# Patient Record
Sex: MALE | Race: WHITE
[De-identification: names, ages, dates, MRNs, and addresses within clinical notes are randomized per-mention and may not be internally consistent; named-entity substitution may affect disease eponyms.]

---

## 2018-01-10 ENCOUNTER — HOSPITAL ENCOUNTER (OUTPATIENT)
Dept: HOSPITAL 7 - FB.SDS | Age: 73
Discharge: HOME | End: 2018-01-10
Attending: SURGERY
Payer: MEDICARE

## 2018-01-10 VITALS — DIASTOLIC BLOOD PRESSURE: 74 MMHG | SYSTOLIC BLOOD PRESSURE: 138 MMHG

## 2018-01-10 DIAGNOSIS — E11.649: ICD-10-CM

## 2018-01-10 DIAGNOSIS — I10: ICD-10-CM

## 2018-01-10 DIAGNOSIS — K31.89: ICD-10-CM

## 2018-01-10 DIAGNOSIS — Z79.899: ICD-10-CM

## 2018-01-10 DIAGNOSIS — Z87.891: ICD-10-CM

## 2018-01-10 DIAGNOSIS — K21.0: ICD-10-CM

## 2018-01-10 DIAGNOSIS — Z88.0: ICD-10-CM

## 2018-01-10 DIAGNOSIS — Z88.8: ICD-10-CM

## 2018-01-10 DIAGNOSIS — I25.2: ICD-10-CM

## 2018-01-10 DIAGNOSIS — I25.10: ICD-10-CM

## 2018-01-10 DIAGNOSIS — E78.5: ICD-10-CM

## 2018-01-10 DIAGNOSIS — K29.70: Primary | ICD-10-CM

## 2018-01-10 DIAGNOSIS — Z95.5: ICD-10-CM

## 2018-01-10 PROCEDURE — 82962 GLUCOSE BLOOD TEST: CPT

## 2018-01-10 PROCEDURE — 43235 EGD DIAGNOSTIC BRUSH WASH: CPT

## 2018-01-10 NOTE — OR
DATE OF OPERATION:  01/10/2018

 

SURGEON:  Kartik Anders MD

 

PROCEDURE PERFORMED:  Upper endoscopy.

 

PREOPERATIVE DIAGNOSIS:  History of melena.

 

POSTOPERATIVE DIAGNOSIS:  Fundic gland hyperplasia and gastritis as well as some

esophagitis.

 

INDICATIONS FOR PROCEDURE:  This is a 72-year-old white male who was initially

referred to me with a history of a positive FIT exam, who was also relating some

GI issues or what appeared to be upper gastrointestinal issues.  He does have a

history of heart disease and was having some angina, and as a result while it

appeared he needed both an upper and lower endoscopy, there was no urgency

involved.  However, he presented approximately a week later, noting black stools

as well as some epigastric abdominal discomfort and we felt that an upper

endoscopy was indicated to evaluate for possible peptic ulcer disease.  He

presents today for his upper endoscopy.  Unfortunately, he was told to stop his

Plavix 3 days before his procedure, which he failed to do.  We did elect to

proceed with just a simple endoscopy to look for ulcer disease.

 

DESCRIPTION OF OPERATION:  After an excellent IV sedation was administered, the

bite block was inserted.  Topical anesthetic gargle was also used.  The flexible

endoscope was passed without difficulty down the patient's esophagus into the

stomach.  The stomach was insufflated.  The scope was passed through the pylorus

to the second portion of the duodenum and slowly withdrawn.  Following findings

are noted.  The duodenum was essentially unremarkable.  Stomach, fundic gland

hyperplasia noted as well as some linear gastritis.  Photos were taken.

Esophagus, some evidence of esophagitis, but no marked ulcerations or worrisome

findings.  Remainder of the esophageal exam was unremarkable.  We are going to

be putting him on some Carafate to take in addition to his Prilosec twice a day.

I will have him follow up after his cardiac evaluation to proceed with his

colonoscopy, and we will probably do an upper endoscopy at that point when we

can do some biopsies.

 

Job#: [id number]

DD: 01/10/2018 0937

DT: 01/10/2018 1051 AS/MODL

## 2018-01-10 NOTE — PCM.OPNOTE
- General Post-Op/Procedure Note


Date of Surgery/Procedure: 01/10/18


Operative Procedure(s): egd


Findings: 





gastritis


fundic gland polyposis


esophagitis





Pre Op Diagnosis: melena


Post-Op Diagnosis: gastritis.  fundic gland polyposis.  esophagitis


Anesthesia Technique: MAC


Primary Surgeon: Kartik Anders


Anesthesia Provider: Harry Dubon


Pathology: 





none





Complications: None


Condition: Good


Free Text/Narrative:: 





see dictation

## 2018-04-05 ENCOUNTER — HOSPITAL ENCOUNTER (OUTPATIENT)
Dept: HOSPITAL 7 - FB.SDS | Age: 73
Discharge: HOME | End: 2018-04-05
Attending: SURGERY
Payer: MEDICARE

## 2018-04-05 VITALS — DIASTOLIC BLOOD PRESSURE: 63 MMHG | SYSTOLIC BLOOD PRESSURE: 154 MMHG

## 2018-04-05 DIAGNOSIS — Z88.6: ICD-10-CM

## 2018-04-05 DIAGNOSIS — D12.4: Primary | ICD-10-CM

## 2018-04-05 DIAGNOSIS — K63.5: ICD-10-CM

## 2018-04-05 DIAGNOSIS — I25.10: ICD-10-CM

## 2018-04-05 DIAGNOSIS — K57.30: ICD-10-CM

## 2018-04-05 DIAGNOSIS — E11.649: ICD-10-CM

## 2018-04-05 DIAGNOSIS — Z88.0: ICD-10-CM

## 2018-04-05 DIAGNOSIS — D12.5: ICD-10-CM

## 2018-04-05 DIAGNOSIS — Z79.4: ICD-10-CM

## 2018-04-05 DIAGNOSIS — I10: ICD-10-CM

## 2018-04-05 DIAGNOSIS — K62.1: ICD-10-CM

## 2018-04-05 DIAGNOSIS — K21.9: ICD-10-CM

## 2018-04-05 DIAGNOSIS — E78.5: ICD-10-CM

## 2018-04-05 PROCEDURE — 45380 COLONOSCOPY AND BIOPSY: CPT

## 2018-04-05 PROCEDURE — 88305 TISSUE EXAM BY PATHOLOGIST: CPT

## 2018-04-05 PROCEDURE — 82962 GLUCOSE BLOOD TEST: CPT

## 2018-04-05 PROCEDURE — 00812 ANES LWR INTST SCR COLSC: CPT

## 2018-04-05 NOTE — PCM.OPNOTE
- General Post-Op/Procedure Note


Date of Surgery/Procedure: 04/05/18


Operative Procedure(s): c scope with bx


Findings: 





descending colon polyp 


sigmoid diverticulosis


rectal mucosal hyperplasia 


sigmoid polyp 


Pre Op Diagnosis: + FIT


Post-Op Diagnosis: descending colon polyp.  sigmoid diverticulosis.  rectal 

mucosal hyperplasia.  sigmoid polyp


Anesthesia Technique: MAC


Primary Surgeon: Kartik Anders


Anesthesia Provider: Coco Oreilly


Pathology: 








descending colon polyp 





rectal mucosal hyperplasia 


sigmoid polyp 


Complications: None


Condition: Good


Free Text/Narrative:: 





see dictation

## 2018-04-05 NOTE — OR
DATE OF OPERATION:  04/05/2018

 

SURGEON:  Kartik Anders MD

 

PROCEDURE PERFORMED:  Colonoscopy with cold forceps biopsy.

 

PREOPERATIVE DIAGNOSIS:  Positive FIT.

 

POSTOPERATIVE DIAGNOSIS:  Descending colon polyp, sigmoid colon polyp x2,

sigmoid diverticulosis and hyperplastic polyps of the rectum.

 

INDICATIONS FOR PROCEDURE:  This is a 72-year-old white male who is referred

with a positive FIT.  He was offered and accepted colonoscopy.

 

DESCRIPTION OF OPERATION:  After an excellent IV sedation was administered,

digital rectal exam was performed.  No marked abnormality was noted.  The

flexible colonoscope was inserted and advanced to the cecum without difficulty.

The prep was good.  We had to irrigate some areas with good view of the mucosa.

The following findings were noted.  Ascending colon, unremarkable.  Transverse

colon, unremarkable.  Descending colon, small polypoid lesion.  Biopsied and

sent for permanent.  Sigmoid; in the proximal and distal sigmoid, two small

polypoid lesions biopsied with cold biopsy forceps and sent for permanent.  Some

moderate sigmoid diverticulosis was also noted.  Rectum, what appeared to be

multiple hyperplastic polyps were noted and representative biopsies were taken

of this area as well.  The colon was deflated as the scope was removed.  The

patient tolerated the procedure well.  Results by letter.

 

Job#: [id number]

DD: 04/05/2018 1054

DT: 04/05/2018 1423 AS/MODL

## 2019-02-10 ENCOUNTER — HOSPITAL ENCOUNTER (EMERGENCY)
Dept: HOSPITAL 7 - FB.ED | Age: 74
Discharge: HOME | End: 2019-02-10
Payer: MEDICARE

## 2019-02-10 VITALS — SYSTOLIC BLOOD PRESSURE: 149 MMHG | DIASTOLIC BLOOD PRESSURE: 72 MMHG

## 2019-02-10 DIAGNOSIS — I25.2: ICD-10-CM

## 2019-02-10 DIAGNOSIS — E11.9: ICD-10-CM
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
## 2019-02-10 DIAGNOSIS — Z88.0: ICD-10-CM

## 2019-02-10 DIAGNOSIS — S82.832A: Primary | ICD-10-CM

## 2019-02-10 DIAGNOSIS — I10: ICD-10-CM

## 2019-02-10 DIAGNOSIS — Z88.1: ICD-10-CM

## 2019-02-10 DIAGNOSIS — Z79.899: ICD-10-CM

## 2019-02-10 NOTE — EDM.PDOC
ED HPI GENERAL MEDICAL PROBLEM





- General


Stated Complaint: FALL, HURT LEFT ANKLE


Time Seen by Provider: 02/10/19 09:42


Source of Information: Reports: Patient, Family


History Limitations: Reports: No Limitations





- History of Present Illness


INITIAL COMMENTS - FREE TEXT/NARRATIVE: 





73 y.o.w m with a h/o IDDM came with his wife to the ed afgter he fell under a 

truck with his left leg. Pt is not able to walk since. he came with is wife on 

the wheel chair to the ed. No N/V/D or dizziness or any other acute medical 

issues. /72 RR 17 Pulse ox 100% on RA Temp 97.5 Pulse 61


Onset Date: 02/10/19


Onset Time: 08:00


Duration: Hour(s):


Location: Reports: Lower Extremity, Left


Quality: Reports: Dull, Throbbing


Severity: Moderate


Improves with: Reports: Rest


Worsens with: Reports: Movement


Context: Reports: Trauma





- Related Data


 Allergies











Allergy/AdvReac Type Severity Reaction Status Date / Time


 


Penicillins Allergy Intermediate Hives Verified 18 11:25


 


meloxicam AdvReac Intermediate CHEST PAIN Verified 18 11:25





   WITH  





   WEAKNESS  











Home Meds: 


 Home Meds





Clopidogrel [Plavix] 75 mg PO DAILY 18 [History]


Insulin Glarg,Human.Rec.Analog [Lantus Solostar] 20 unit SUBCUT DAILY 18 [

History]


Lisinopril [Prinivil] 40 mg PO DAILY 18 [History]


Metoprolol Succinate [Toprol Xl] 25 mg PO DAILY 18 [History]


Nitroglycerin [Nitrostat] 0.4 mg SL ASDIRECTED PRN 18 [History]


Omeprazole Magnesium [Prilosec Otc] 20 mg PO BEDTIME 18 [History]


atorvaSTATin [Lipitor] 40 mg PO BEDTIME 18 [History]


glipiZIDE [Glipizide Xl] 10 mg PO BID 18 [History]


Sucralfate [Carafate] 1 gm PO ACBED #120 tab 01/10/18 [Rx]


Bismuth Subsalicylate [Pepto-Bismol] 30 ml PO ASDIRECTED 18 [History]


Empagliflozin [Jardiance] 10 mg PO DAILY 18 [History]


Fluticasone Propionate [Flonase] 1 spray NS BID 18 [History]


Isosorbide Mononitrate [Imdur] 30 mg PO DAILY 18 [History]


SitaGLIPtin [Januvia] 100 mg PO DAILY 18 [History]











Past Medical History


HEENT History: Reports: Cataract


Cardiovascular History: Reports: Angina, High Cholesterol, Hypertension, MI, 

Stents


Respiratory History: Reports: SOB


Gastrointestinal History: Reports: Diverticulosis, GERD


Genitourinary History: Reports: UTI, Recurrent, Other (See Below)


Other Genitourinary History: POST-TRAUMATIC BULBOUS URETHRAL STRICTURE


Musculoskeletal History: Reports: None


Neurological History: Reports: None


Psychiatric History: Reports: None


Endocrine/Metabolic History: Reports: Diabetes, Type II


Hematologic History: Reports: None


Immunologic History: Reports: None


Oncologic (Cancer) History: Reports: None


Dermatologic History: Reports: Cellulitis


Other Dermatologic History: CELLULITIS OF RIGHT LEG POST TRAUMA.





- Infectious Disease History


Infectious Disease History: Reports: Chicken Pox, Measles, Mumps





- Past Surgical History


Male  Surgical History: Reports: Vasectomy





Social & Family History





- Family History


GI: Reports: Colon Polyps


Other GI Family History: DAD  OF COLON CA





- Caffeine Use


Caffeine Use: Reports: Coffee, Soda





Review of Systems





- Review of Systems


Review Of Systems: See Below


Constitutional: Reports: No Symptoms


Eyes: Reports: No Symptoms


Ears: Reports: No Symptoms


Nose: Reports: No Symptoms


Mouth/Throat: Reports: No Symptoms


Respiratory: Reports: No Symptoms


Cardiovascular: Reports: No Symptoms


GI/Abdominal: Reports: No Symptoms


Genitourinary: Reports: No Symptoms


Musculoskeletal: Reports: Other (ankle swelling)


Skin: Reports: No Symptoms


Neurological: Reports: No Symptoms


Psychiatric: Reports: No Symptoms





ED EXAM, GENERAL





- Physical Exam


Exam: See Below


Exam Limited By: No Limitations


General Appearance: Alert, WD/WN, Moderate Distress


Eye Exam: Bilateral Eye: Normal Inspection


Ears: Normal External Exam


Ear Exam: Bilateral Ear: Auricle Normal


Nose: Normal Inspection, Normal Mucosa


Throat/Mouth: Normal Lips, Normal Voice, No Airway Compromise


Head: Atraumatic, Normocephalic


Neck: Normal Inspection, Supple, Non-Tender, Full Range of Motion


Respiratory/Chest: No Respiratory Distress, Lungs Clear, Normal Breath Sounds


Cardiovascular: Normal Peripheral Pulses, Regular Rate, Rhythm, No Edema, No 

Gallop


Peripheral Pulses: 2+: Carotid (L)


GI/Abdominal: Normal Bowel Sounds, Soft, Non-Tender, No Organomegaly, No 

Abnormal Bruit, No Mass, Pelvis Stable


 (Male) Exam: Deferred


Rectal (Males) Exam: Deferred


Back Exam: Normal Inspection


Extremities: Joint Swelling (left ankle)


Neurological: Alert, Oriented, CN II-XII Intact, Normal Cognition, Abnormal 

Gait (left ankle pain and swelling)


Psychiatric: Normal Affect, Normal Mood


Skin Exam: Warm, Dry, Intact, Normal Color


Lymphatic: No Adenopathy





ED TRAUMA EXTREMITY PROCEDURES





- Splinting


  ** Left Lower Extremity


Splint Site: short , posterior splint left lower extremity


Pre-Procedure NV Status: Normal


Post-Procedure NV Status: Normal


Splint Material: Fiberglass


Splint Design: Posterior


Applied & Form Fitted By: Provider


Provider Post-Splint Application NV Check: NV Status Normal, Good Position


Complications: No





Course





- Vital Signs


Text/Narrative:: 





73 y.o.w m with a h/o IDDM came with his wife to the ed afgter he fell under a 

truck with his left leg. Pt is not able to walk since. he came with is wife on 

the wheel chair to the ed. No N/V/D or dizziness or any other acute medical 

issues. /72 RR 17 Pulse ox 100% on RA Temp 97.5 Pulse 61


PE: WNWD W M with left ankle pain/swelling


Imaging: Fx'd mils displace left distal fibula


Labs: Not indicated


Impression: Left distal fibula fx with displacement


Tx: Motrin, Post short splint placement


Reexam: Improved


Plan: D/C with instructions


Last Recorded V/S: 


 Last Vital Signs











Temp      


 


Pulse      


 


Resp  17   02/10/19 12:05


 


BP  149/72 H  02/10/19 12:05


 


Pulse Ox  100   02/10/19 12:05














- Orders/Labs/Meds


Orders: 


 Active Orders 24 hr











 Category Date Time Status


 


 Accu Check [Blood Glucose Check, Bedside] [RC] ONETIME Care  02/10/19 09:56 

Active


 


 Cooling Warming Measures [RC] ASDIRECTED Care  02/10/19 09:54 Active


 


 Ankle Min 3V Lt [CR] Stat Exams  02/10/19 09:54 Taken


 


 Ice Bag [Ice Therapy] [OM.PC] Routine Oth  02/10/19 09:54 Ordered











Meds: 


Medications














Discontinued Medications














Generic Name Dose Route Start Last Admin





  Trade Name Destiny  PRN Reason Stop Dose Admin


 


Ibuprofen  600 mg  02/10/19 09:54  02/10/19 10:07





  Motrin  PO  02/10/19 09:55  600 mg





  ONETIME ONE   Administration





     





     





     





     














Departure





- Departure


Time of Disposition: 11:53


Disposition: Home, Self-Care 01


Condition: Good


Clinical Impression: 


Fibula fracture


Qualifiers:


 Encounter type: initial encounter Fracture type: closed Laterality: left 








- Discharge Information


Referrals: 


Maksim Love MD [Primary Care Provider] - 


Librado Campos DO [Physician] - 


Forms:  ED Department Discharge


Additional Instructions: 


Rest, Ice elevation, please use crutches, no weight bearing. Please f/u with 

Dr. Campos this Monday. Motrin for pain. Please come back if your symptoms get 

worse acutely





- My Orders


Last 24 Hours: 


My Active Orders





02/10/19 09:54


Cooling Warming Measures [RC] ASDIRECTED 


Ankle Min 3V Lt [CR] Stat 


Ice Bag [Ice Therapy] [OM.PC] Routine 





02/10/19 09:56


Accu Check [Blood Glucose Check, Bedside] [RC] ONETIME 














- Assessment/Plan


Last 24 Hours: 


My Active Orders





02/10/19 09:54


Cooling Warming Measures [RC] ASDIRECTED 


Ankle Min 3V Lt [CR] Stat 


Ice Bag [Ice Therapy] [OM.PC] Routine 





02/10/19 09:56


Accu Check [Blood Glucose Check, Bedside] [RC] ONETIME

## 2019-02-11 NOTE — CR
INDICATION:  Slipped and rolled ankle.



LEFT ANKLE:  Three views of the left ankle were obtained, 02/10/19 - no 
comparisons.  



Soft tissue swelling is noted about the ankle, most prominent medially.  



The ankle mortise appeared to be slightly askew with widening at the medial 
ankle mortise and very minimal inversion of the talus with respect to the 
tibia.  



An oblique fracture through the distal shaft and metaphysis of the fibula is 
noted with mild deformity - posterior and lateral offset of the distal fracture 
fragment of approximately 3-4 mm.  



Hypertrophic degenerative changes are also noted, compatible with posttraumatic 
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



Plantar and posterior calcaneal spurs of small size are noted.



IMPRESSION:  Lateral malleolar fracture with subluxation at the ankle mortise - 
lateral subluxation minimally of the talus with slight inversion of the talus.  
Ligamental injury medially is suspected.  The ankle mortise may be unstable - 
followup studies such as MRI may be helpful for further evaluation, depending 
upon clinical correlation.

LAKSHMI

## 2020-11-10 NOTE — OR
DATE OF OPERATION:  11/10/2020

 

SURGEON:  Zunilda Bazzi MD

 

PREOPERATIVE DIAGNOSIS:  Visually significant cataract, right eye.

 

POSTOPERATIVE DIAGNOSIS:  Visually significant cataract, right eye.

 

PROCEDURES PERFORMED:  Phacoemulsification with intraocular lens placement,

right eye.

 

ASSISTANTS:  None.

 

ANESTHESIA:  Local with sedation.

 

COMPLICATIONS:  None.

 

BLOOD LOSS:  None.

 

IMPLANTS:  An Franc ACU0T0, 22.0 diopter lens, serial number [id number]

implanted.

 

CDE:  6.52.

 

DESCRIPTION OF PROCEDURE:  After risks and benefits were reviewed with the

patient, consent was obtained in the preoperative area, and the operative eye

was marked with a surgical pen.  In the preoperative area, a pledget was used to

dilate the pupil consisting of a mixture of phenylephrine 10%, cyclopentolate

2%, moxifloxacin 0.5%, and bupivacaine 0.75%.  The patient was taken to the

operating room, where a time-out was performed, and the patient was placed under

monitored anesthesia care.  Topical tetracaine was used for anesthesia.

 

The operative eye was prepped and draped for ophthalmic surgery, and the

microscope was brought into position and focused.  A paracentesis incision was

made, followed by injection of preservative-free 1% lidocaine into the anterior

chamber, followed by injection of Viscoat into the anterior chamber.  A

microkeratome blade was used to make a corneal limbal incision temporally.  A

cystotome was used to make the beginning of the capsulorrhexis, which was

carried around 360 degrees in a curvilinear fashion using Utrata forceps.  A

Kwong cannula with BSS was used to hydrodissect and hydrodelineate the nucleus.

The nucleus was removed in a divide and conquer manner using

phacoemulsification.  Irrigation and aspiration were used to remove the

remaining cortical material.  Provisc was used to inflate the capsular bag, and

a pre-loaded Franc ACU0T0, 22.0 diopter lens, serial number [id number] was

injected into the capsular bag.  A Sinskey hook was used to position and center

the lens.

 

Next, irrigation and aspiration was used to remove any remaining viscoelastic

and cortical material from the anterior chamber.  BSS on a cannula was used to

inflate the anterior chamber and hydrate the wound.  The wound was checked and

found to be watertight.  1 mg of Moxifloxacin was injected into the anterior

chamber.  Drapes were removed and the eye was cleaned.  A drop of brimonidine

0.15% and a drop of TobraDex was placed.  The eye was shielded, and the patient

was taken to the recovery room in stable condition.

 

Job#: [id number]

DD: 11/10/2020 0822

DT: 11/10/2020 1029 AK/KRISTY

## 2020-11-25 NOTE — OR
DATE OF OPERATION:  11/24/2020

 

SURGEON:  Zunilda Bazzi MD

 

PREOPERATIVE DIAGNOSIS:  Visually significant cataract, left eye.

 

POSTOPERATIVE DIAGNOSIS:  Visually significant cataract, left eye.

 

PROCEDURES PERFORMED:  Phacoemulsification with intraocular lens placement, left

eye.

 

ASSISTANTS:  None.

 

ANESTHESIA:  Local with sedation.

 

COMPLICATIONS:  None.

 

BLOOD LOSS:  None.

 

IMPLANTS:  An Franc ACU0T0, 22.0 diopter lens implanted.

 

CDE:  4.22.

 

DESCRIPTION OF PROCEDURE:  After risks and benefits were reviewed with the

patient, consent was obtained in the preoperative area, and the operative eye

was marked with a surgical pen.  In the preoperative area, a pledget was used to

dilate the pupil consisting of a mixture of phenylephrine 10%, cyclopentolate

2%, moxifloxacin 0.5%, and bupivacaine 0.75%.  The patient was taken to the

operating room, where a time-out was performed, and the patient was placed under

monitored anesthesia care.  Topical tetracaine was used for anesthesia.

 

The operative eye was prepped and draped for ophthalmic surgery, and the

microscope was brought into position and focused.  A paracentesis incision was

made, followed by injection of preservative-free 1% lidocaine into the anterior

chamber, followed by injection of Viscoat into the anterior chamber.  A

microkeratome blade was used to make a corneal limbal incision temporally.  A

cystotome was used to make the beginning of the capsulorrhexis, which was

carried around 360 degrees in a curvilinear fashion using Utrata forceps.  A

Kwong cannula with BSS was used to hydrodissect and hydrodelineate the nucleus.

The nucleus was removed in a divide and conquer manner using

phacoemulsification.  Irrigation and aspiration were used to remove the

remaining cortical material.  Provisc was used to inflate the capsular bag, and

a pre-loaded Franc ACU0T0, 22.0 diopter lens, serial number [id number] was

injected into the capsular bag.  A Sinskey hook was used to position and center

the lens.

 

Next, irrigation and aspiration was used to remove any remaining viscoelastic

and cortical material from the anterior chamber.  BSS on a cannula was used to

inflate the anterior chamber and hydrate the wound.  The wound was checked and

found to be watertight.  1 mg of Moxifloxacin was injected into the anterior

chamber.  Drapes were removed and the eye was cleaned.  A drop of brimonidine

0.15% and a drop of TobraDex was placed.  The eye was shielded, and the patient

was taken to the recovery room in stable condition.

 

Job#: [id number]

DD: 11/24/2020 0821

DT: 11/24/2020 0939 AK/KRISTY

## 2021-01-13 NOTE — PCM.HP.2
H&P History of Present Illness





- General


Date of Service: 21


Admit Problem/Dx: 


                           Admission Diagnosis/Problem





Admission Diagnosis/Problem      Pneumonia








Source of Information: Patient, Provider


History Limitations: Reports: Physical Impairment (hard of hearing & some 

neurocognitive issues)





- History of Present Illness


Initial Comments - Free Text/Narative: 





Maksim was seen in [facility] today for 7 days of fever, fatigue, 

shortness of breath, cough, runny nose but no sore throat, chest pain, nausea, 

vomiting, diarrhea, constipation. Denies any dysuria, frequency, dark urine. 

States his last bowel movement was yesterday, yellow chunks but not liquid. Temp

in clinic today was 101F, Covid was positive and had bilateral infiltrates on 

chest x-ray, no other labs done in clinic. He is very hard of hearing and has 

some neurocognitive deficits, on Aricept. History of Diabetes on insulin, poorly

controlled, pacemaker, hypertension, cataract repair, colon polyps, GERD. 





- Related Data


Allergies/Adverse Reactions: 


                                    Allergies











Allergy/AdvReac Type Severity Reaction Status Date / Time


 


Penicillins Allergy Intermediate Hives Verified 20 07:24


 


iodine Allergy  Rash Verified 20 07:24


 


meloxicam AdvReac Intermediate Dizziness Verified 20 07:24











Home Medications: 


                                    Home Meds





Clopidogrel [Plavix] 75 mg PO DAILY 18 [History]


Metoprolol Succinate [Toprol Xl] 25 mg PO DAILY 18 [History]


Nitroglycerin [Nitrostat] 0.4 mg SL ASDIRECTED PRN 18 [History]


Omeprazole Magnesium [Prilosec Otc] 20 mg PO BEDTIME 18 [History]


atorvaSTATin [Lipitor] 40 mg PO BEDTIME 18 [History]


glipiZIDE [Glipizide Xl] 10 mg PO BID 18 [History]


Albuterol Sulfate [Albuterol Sulfate Hfa] 2 puff PO Q4HR PRN 20 [History]


Insulin Detemir [Levemir Flextouch] 20 unit SQ BID 20 [History]


SitaGLIPtin [Januvia] 100 mg PO DAILY 20 [History]


Triamcinolone Acetonide [Kenalog 0.1% Crm] 1 applic TOP BID 20 [History]


Acetaminophen 650 mg PO Q6H PRN 21 [History]


Donepezil [Aricept] 5 mg PO BEDTIME 21 [History]











Past Medical History


HEENT History: Reports: Cataract, Impaired Vision


Cardiovascular History: Reports: Angina, High Cholesterol, Hypertension, MI, 

Pacemaker, Stents


Respiratory History: Reports: SOB


Gastrointestinal History: Reports: Diverticulosis, GERD


Genitourinary History: Reports: UTI, Recurrent, Other (See Below)


Other Genitourinary History: POST-TRAUMATIC BULBOUS URETHRAL STRICTURE


Musculoskeletal History: Reports: None


Neurological History: Reports: None


Psychiatric History: Reports: None


Endocrine/Metabolic History: Reports: Diabetes, Type II


Hematologic History: Reports: None


Immunologic History: Reports: None


Oncologic (Cancer) History: Reports: None


Dermatologic History: Reports: Cellulitis


Other Dermatologic History: CELLULITIS OF RIGHT LEG POST TRAUMA.





- Infectious Disease History


Infectious Disease History: Reports: Chicken Pox, Measles, Mumps





- Past Surgical History


Head Surgeries/Procedures: Reports: None


HEENT Surgical History: Reports: Cataract Surgery


Cardiovascular Surgical History: Reports: Coronary Artery Stent


GI Surgical History: Reports: Colonoscopy, EGD


Male  Surgical History: Reports: Vasectomy


Other Male  Surgeries/Procedures: CYSTOSCOPY, STENT PLACEMENT TO KIDNEY





Social & Family History





- Family History


Family Medical History: No Pertinent Family History


GI: Reports: Colon Polyps


Other GI Family History: DAD  OF COLON CA





- Tobacco Use


Tobacco Use Status *Q: Former Tobacco User


Used Tobacco, but Quit: Yes


Month/Year Tobacco Last Used: 20 years ago





- Caffeine Use


Caffeine Use: Reports: Soda





- Recreational Drug Use


Recreational Drug Use: No





H&P Review of Systems





- Review of Systems:


Review Of Systems: Comprehensive ROS is negative, except as noted in HPI.





Exam





- Exam


Exam: See Below





- Vital Signs


Vital Signs: 


                                Last Vital Signs











Temp  98.8 F   21 16:30


 


Pulse  76   21 16:30


 


Resp  20   21 16:30


 


BP  139/68   21 16:30


 


Pulse Ox  91 L  21 16:30











Weight: 241 lb 14.4 oz





- Exam


Quality Assessment: Supplemental Oxygen


General: Alert, Oriented (person, place), Cooperative, Mild Distress


HEENT: PERRLA, Conjunctiva Clear, EACs Clear, EOMI, Mucosa Moist & Pink.  No: 

Hearing Intact


Neck: Supple, Trachea Midline.  No: Lymphadenopathy


Lungs: Clear to Auscultation (RML, BUL), Normal Respiratory Effort, Decreased 

Breath Sounds (bibasilar), Crackles (bibasilar)


Cardiovascular: Regular Rate, Regular Rhythm.  No: Systolic Murmur


GI/Abdominal Exam: Normal Bowel Sounds, Soft, Non-Tender, No Distention


 (Male) Exam: Deferred


Rectal (Males) Exam: Deferred


Extremities: Normal Inspection, No Pedal Edema, Normal Capillary Refill


Peripheral Pulses: 2+: Radial (L), Radial (R)


Skin: Warm, Dry, Intact





- Patient Data


Lab Results Last 24 hrs: 


CBC, CMP, PTT, CRP, ABG pending


Imaging Impressions Last 24 hrs: 


Bilateral patchy infiltrates on [facility] Chest x-ray





Sepsis Event Note





- Focused Exam


Vital Signs: 


                                   Vital Signs











  Temp Pulse Resp BP Pulse Ox


 


 21 16:30  98.8 F  76  20  139/68  91 L














*Q Meaningful Use (ADM)





- VTE Risk Assess *Q


Each Risk Factor Represents 1 Point: Obesity ( BMI > 25 kg/m2), Serious lung 

disease including pneumonia


Total Score 1 Point Risk Factors: 2


Each Risk Factor Represents 2 Points: None


Total Score 2 Point Risk Factors: 0


Each Risk Factor Represents 3 Points: Age 75 Years or Greater


Total Score 3 Point Risk Factors: 3


Each Risk Factor Represents 5 Points: None


Total Score 5 Point Risk Factors: 0


Venous Thromboembolism Risk Factor Score *Q: 5





- Problem List


(1) COVID-19


SNOMED Code(s): 379652785


   ICD Code: U07.1 - COVID-19   Status: Acute   Current Visit: Yes   





(2) Viral pneumonia


SNOMED Code(s): 73500897


   ICD Code: J12.9 - VIRAL PNEUMONIA, UNSPECIFIED   Status: Acute   Current 

Visit: Yes   





(3) Hypoxic


SNOMED Code(s): 861152343


   ICD Code: R09.02 - HYPOXEMIA   Status: Acute   Current Visit: Yes   





(4) Diabetes


SNOMED Code(s): 95697760


   ICD Code: E11.9 - TYPE 2 DIABETES MELLITUS WITHOUT COMPLICATIONS   Status: [facility]   Current Visit: Yes   


Qualifiers: 


   Diabetes mellitus long term insulin use: with long term use 





(5) GERD (gastroesophageal reflux disease)


SNOMED Code(s): 533701426


   ICD Code: K21.9 - GASTRO-ESOPHAGEAL REFLUX DISEASE WITHOUT ESOPHAGITIS   

Status: Chronic   Priority: Medium   Current Visit: No   


Qualifiers: 


   Esophagitis presence: with esophagitis 





(6) Neurocognitive deficits


SNOMED Code(s): 285181985


   ICD Code: R29.818 - OTHER SYMPTOMS AND SIGNS INVOLVING THE NERVOUS SYSTEM; 

R41.89 - OTH SYMPTOMS AND SIGNS W COGNITIVE FUNCTIONS AND AWARENESS   Status: 

Chronic   Current Visit: Yes   





(7) Palliative care patient


SNOMED Code(s): 101420861, 569753831


   ICD Code: Z51.5 - ENCOUNTER FOR PALLIATIVE CARE   Status: Chronic   Current 

Visit: Yes   





(8) CHF (congestive heart failure)


SNOMED Code(s): 63629840


   ICD Code: I50.9 - HEART FAILURE, UNSPECIFIED   Status: Chronic   Current 

Visit: Yes   Problem Details: Echo in 2018, EF of 65%   


Qualifiers: 


   Heart failure type: unspecified 


Problem List Initiated/Reviewed/Updated: Yes


Orders Last 24hrs: 


                               Active Orders 24 hr











 Category Date Time Status


 


 Patient Status [ADT] Routine ADT  21 16:55 Active


 


 Blood Glucose Check, Bedside [RC] QIDACANDBED Care  21 16:55 Active


 


 Incentive Spirometry [RT Incentive Spirometry] [RC] Care  21 17:08 Active





 Q1HWA   


 


 Nurse Communication: Isolation [RC] ASDIRECTED Care  21 17:02 Active


 


 Oxygen Therapy [RC] PRN Care  21 16:55 Active


 


 RT Post Treatment Assessment [RC] Click to Edit Care  21 17:04 Active


 


 Up With Assistance [RC] ASDIRECTED Care  21 16:55 Active


 


 Up to Chair [RC] ASDIRECTED Care  21 16:55 Active


 


 VTE/DVT Education [RC] Per Unit Routine Care  21 16:55 Active


 


 Vital Signs [RC] Q4H Care  21 16:55 Active


 


 Consistent Carbohydrate Diet [DIET] Diet  21 Dinner Active


 


 BLOOD GAS ARTERIAL [BG] Stat Lab  21 16:55 Ordered


 


 C-REACTIVE PROTEIN [CHEM] Stat Lab  21 16:55 Ordered


 


 CBC WITH AUTO DIFF [HEME] Routine Lab  21 06:00 Ordered


 


 CBC WITH AUTO DIFF [HEME] Stat Lab  21 16:55 Ordered


 


 COMPREHENSIVE METABOLIC PN,CMP [CHEM] Routine Lab  21 06:00 Ordered


 


 COMPREHENSIVE METABOLIC PN,CMP [CHEM] Stat Lab  21 16:55 Ordered


 


 PTT,PARTIAL THROMBOPLSTIN TIME [COAG] Stat Lab  21 16:55 Ordered


 


 Acetaminophen [TylenoL] Med  21 16:55 Active





 650 mg PO Q4H PRN   


 


 Albuterol [Ventolin HFA] Med  21 17:03 Active





 0 gm INH Q4H PRN   


 


 Clopidogrel [Plavix] Med  21 09:00 Active





 75 mg PO DAILY   


 


 Dextrose 50% in Water Med  21 17:02 Active





 50 ml IVPUSH ASDIRECTED PRN   


 


 Donepezil [Aricept] Med  21 21:00 Active





 5 mg PO BEDTIME   


 


 Glucagon,Human Recombinant [GlucaGen] Med  21 17:02 Active





 1 mg IM ASDIRECTED PRN   


 


 Insulin Glarg,Human.Rec.Analog [LantUS Solostar] Med  21 21:00 Active





 20 units SUBCUT BID   


 


 Insulin Lispro [HumaLOG] Med  21 18:00 Active





 See Protocol  SUBCUT TIDMEALS   


 


 Metoprolol Succinate [Toprol XL] Med  21 09:00 Active





 25 mg PO DAILY   


 


 Nitroglycerin [Nitrostat] Med  21 17:03 Active





 0.4 mg SL ASDIRECTED PRN   


 


 Ondansetron [Zofran ODT] Med  21 16:55 Active





 4 mg PO Q6H PRN   


 


 Pantoprazole [ProTONIX***] Med  21 21:00 Active





 40 mg PO BEDTIME   


 


 SitaGLIPtin [Januvia] Med  21 09:00 Active





 100 mg PO DAILY   


 


 Sodium Chloride 0.9% [Normal Saline] 1,000 ml Med  21 17:00 Active





 IV ASDIRECTED   


 


 Sodium Chloride 0.9% [Saline Flush] Med  21 16:55 Active





 10 ml FLUSH ASDIRECTED PRN   


 


 atorvaSTATin [Lipitor] Med  21 21:00 Active





 40 mg PO BEDTIME   


 


 dexAMETHasone Med  21 17:15 Active





 6 mg IVPUSH DAILY   


 


 glipiZIDE [Glucotrol XL] Med  21 21:00 Active





 10 mg PO BID   


 


 Antiembolic Hose [OM.PC] Per Unit Routine Oth  21 16:56 Ordered


 


 Isolation [COMM] Stat Oth  21 17:02 Ordered


 


 Saline Lock Insert [OM.PC] Routine Oth  21 16:55 Ordered


 


 Resuscitation Status Routine Resus Stat  21 16:55 Ordered








                                Medication Orders





Acetaminophen (Tylenol)  650 mg PO Q4H PRN


   PRN Reason: Pain (Mild 1-3)/fever


Albuterol (Ventolin Hfa)  0 gm INH Q4H PRN


   PRN Reason: Wheezing


Atorvastatin Calcium (Lipitor)  40 mg PO BEDTIME MIKAEL


Clopidogrel Bisulfate (Plavix)  75 mg PO DAILY MIKAEL


Dexamethasone (Dexamethasone)  6 mg IVPUSH DAILY MIKAEL


   Stop: 21 17:16


Dextrose/Water (Dextrose 50% In Water)  50 ml IVPUSH ASDIRECTED PRN


   PRN Reason: Hypoglycemia


Donepezil HCl (Aricept)  5 mg PO BEDTIME MIKAEL


Glipizide (Glucotrol Xl)  10 mg PO BID MIKAEL


Glucagon (Glucagen)  1 mg IM ASDIRECTED PRN


   PRN Reason: Hypoglycemia


Sodium Chloride (Normal Saline)  1,000 mls @ 100 mls/hr IV ASDIRECTED [facility]


Insulin Glargine (Lantus Solostar)  20 units SUBCUT BID [facility]


Insulin Human Lispro (Humalog)  0 unit SUBCUT TIDMEALS MIKAEL; Protocol


Metoprolol Succinate (Toprol Xl)  25 mg PO DAILY MIKAEL


Nitroglycerin (Nitrostat)  0.4 mg SL ASDIRECTED PRN


   PRN Reason: Chest Pain


Ondansetron HCl (Zofran Odt)  4 mg PO Q6H PRN


   PRN Reason: nausea, able to take PO


Pantoprazole Sodium (Protonix***)  40 mg PO BEDTIME MIKAEL


Sitagliptin Phosphate (Januvia)  100 mg PO DAILY [facility]


Sodium Chloride (Saline Flush)  10 ml FLUSH ASDIRECTED PRN


   PRN Reason: Keep Vein Open








Assessment/Plan Comment:: 





1. Admit to inpatient status for viral pneumonia, COVID positive, Diabetic.


2. CBC, CMP, PTT, ABG, CRP, procalcitonin pending. Dexamethasone 6 mg IV daily, 

will hold off on Remdesivir until chemistry is back. Oxygen to keep sats >94% by

 nasal cannula. Repeat labs tomorrow. NS at 100 ml/hr.


3. DM: Accuchecks qidac&hs, Consistent carb diet, Lantus substitute for Levemir 

20 units bid, Humalog sliding scale. Continue home oral medications. 


4. DVT: Hasmukh BLE, Plavix 75 mg daily, hold off on Lovenox until we get his 

chemistry.


5. CODE STATUS: FULL. 





- Mortality Measure


Prognosis:: Poor

## 2021-01-14 NOTE — PCM.PN
- General Info


Date of Service: 01/14/21


Subjective Update: 


Ramone is very hard of hearing but states he feels same, short of breath, sugars 

were low last night, held Glipizide, up to 311 this morning after dexamethasone 

yesterday. He is stable on 5L, has been more emotional since admission. 

Spiritual services will see. Spoke with his wife on phone, she will bring in 

 cord so she can talk with him on his cell phone. No nausea, vomiting, 

chest pain or diarrhea. 





- Patient Data


Vitals - Most Recent: 


                                Last Vital Signs











Temp  97.3 F   01/14/21 08:00


 


Pulse  93   01/14/21 09:03


 


Resp  20   01/14/21 08:00


 


BP  131/74   01/14/21 09:03


 


Pulse Ox  93 L  01/14/21 08:00











Weight - Most Recent: 241 lb 14.4 oz


I&O - Last 24 Hours: 


                                 Intake & Output











 01/13/21 01/14/21 01/14/21





 22:59 06:59 14:59


 


Intake Total   1446


 


Balance   1446











Lab Results Last 24 Hours: 


                         Laboratory Results - last 24 hr











  01/13/21 01/13/21 01/13/21 Range/Units





  17:37 18:15 18:20 


 


WBC    4.3  (3.2-10.1)  x10-3/uL


 


RBC    4.43  (3.90-5.90)  x10(6)uL


 


Hgb    12.6 L  (12.9-17.7)  g/dL


 


Hct    37.9 L  (38.3-50.1)  %


 


MCV    85.7  (80.8-98.7)  fL


 


MCH    28.5  (27.0-33.3)  pg


 


MCHC    33.3  (28.7-35.3)  g/dL


 


RDW    15.0  (12.4-15.0)  %


 


Plt Count    94 L  (117-477)  x10(3)uL


 


MPV    9.5  (6.7-11.0)  fL


 


Neut % (Auto)    77.5 H  (40.3-71.8)  %


 


Lymph % (Auto)    12.8 L  (15.8-45.3)  %


 


Mono % (Auto)    9.3  (5.5-15.2)  %


 


Eos % (Auto)    0.2  (0.1-6.8)  %


 


Baso % (Auto)    0.2 L  (0.3-3.8)  %


 


Neut # (Auto)    3.3  (1.7-6.9)  x10-3/uL


 


Lymph # (Auto)    0.6  (0.5-4.5)  x10-3/uL


 


Mono # (Auto)    0.4  (0.0-1.2)  x10-3/uL


 


Eos # (Auto)    0.0  (0.0-0.6)  x10-3/uL


 


Baso # (Auto)    0.0  (0.0-0.3)  x10-3/uL


 


Add Manual Diff     


 


Neutrophils % (Manual)     (46-82)  %


 


Band Neutrophils %     (0-6)  %


 


Lymphocytes % (Manual)     (13-37)  %


 


Monocytes % (Manual)     (4-12)  %


 


Giant Platelets     


 


Anisocytosis     


 


APTT     (24.4-33.2)  SECONDS


 


POC ABG pH   7.5 H   (7.35-7.45)  pH


 


POC ABG pCO2   30 L   (35-48)  mmHg


 


POC ABG pO2   56 L   ()  mmHg


 


POC ABG HCO3   22   (21-28)  mmol/L


 


ABG O2 Sat (Calculated)   91.2 L   (94-98)  %


 


POC ABG Base Excess   -2   (-2-3)  mmol/L


 


Terence Test   Pass   (PASS)  


 


O2 Delivery Device   Nasal cannula   


 


Sodium     (135-145)  mmol/L


 


Potassium     (3.5-5.3)  mmol/L


 


Chloride     (100-110)  mmol/L


 


Carbon Dioxide     (21-32)  mmol/L


 


BUN     (7-18)  mg/dL


 


Creatinine     (0.70-1.30)  mg/dL


 


Est Cr Clr Drug Dosing     mL/min


 


Estimated GFR (MDRD)     (>60)  


 


BUN/Creatinine Ratio     (9-20)  


 


Glucose     ()  mg/dL


 


POC Glucose  70 L    ()  mg/dL


 


Calcium     (8.6-10.2)  mg/dL


 


Total Bilirubin     (0.1-1.3)  mg/dL


 


AST     (5-25)  IU/L


 


ALT     (12-36)  U/L


 


Alkaline Phosphatase     ()  IU/L


 


C-Reactive Protein     (0.5-0.9)  mg/dL


 


Total Protein     (6.0-8.0)  g/dL


 


Albumin     (3.2-4.6)  g/dL


 


Globulin     g/dL


 


Albumin/Globulin Ratio     














  01/13/21 01/13/21 01/13/21 Range/Units





  18:20 18:20 18:20 


 


WBC     (3.2-10.1)  x10-3/uL


 


RBC     (3.90-5.90)  x10(6)uL


 


Hgb     (12.9-17.7)  g/dL


 


Hct     (38.3-50.1)  %


 


MCV     (80.8-98.7)  fL


 


MCH     (27.0-33.3)  pg


 


MCHC     (28.7-35.3)  g/dL


 


RDW     (12.4-15.0)  %


 


Plt Count     (117-477)  x10(3)uL


 


MPV     (6.7-11.0)  fL


 


Neut % (Auto)     (40.3-71.8)  %


 


Lymph % (Auto)     (15.8-45.3)  %


 


Mono % (Auto)     (5.5-15.2)  %


 


Eos % (Auto)     (0.1-6.8)  %


 


Baso % (Auto)     (0.3-3.8)  %


 


Neut # (Auto)     (1.7-6.9)  x10-3/uL


 


Lymph # (Auto)     (0.5-4.5)  x10-3/uL


 


Mono # (Auto)     (0.0-1.2)  x10-3/uL


 


Eos # (Auto)     (0.0-0.6)  x10-3/uL


 


Baso # (Auto)     (0.0-0.3)  x10-3/uL


 


Add Manual Diff     


 


Neutrophils % (Manual)     (46-82)  %


 


Band Neutrophils %     (0-6)  %


 


Lymphocytes % (Manual)     (13-37)  %


 


Monocytes % (Manual)     (4-12)  %


 


Giant Platelets     


 


Anisocytosis     


 


APTT  33.1    (24.4-33.2)  SECONDS


 


POC ABG pH     (7.35-7.45)  pH


 


POC ABG pCO2     (35-48)  mmHg


 


POC ABG pO2     ()  mmHg


 


POC ABG HCO3     (21-28)  mmol/L


 


ABG O2 Sat (Calculated)     (94-98)  %


 


POC ABG Base Excess     (-2-3)  mmol/L


 


Terence Test     (PASS)  


 


O2 Delivery Device     


 


Sodium   133 L   (135-145)  mmol/L


 


Potassium   3.0 L   (3.5-5.3)  mmol/L


 


Chloride   98 L   (100-110)  mmol/L


 


Carbon Dioxide   24   (21-32)  mmol/L


 


BUN   13   (7-18)  mg/dL


 


Creatinine   1.0   (0.70-1.30)  mg/dL


 


Est Cr Clr Drug Dosing   71.02   mL/min


 


Estimated GFR (MDRD)   > 60   (>60)  


 


BUN/Creatinine Ratio   13.0   (9-20)  


 


Glucose   72 L   ()  mg/dL


 


POC Glucose     ()  mg/dL


 


Calcium   8.1 L   (8.6-10.2)  mg/dL


 


Total Bilirubin   0.8   (0.1-1.3)  mg/dL


 


AST   51 H   (5-25)  IU/L


 


ALT   29   (12-36)  U/L


 


Alkaline Phosphatase   79   ()  IU/L


 


C-Reactive Protein    16.4 H*  (0.5-0.9)  mg/dL


 


Total Protein   6.6   (6.0-8.0)  g/dL


 


Albumin   2.8 L   (3.2-4.6)  g/dL


 


Globulin   3.8   g/dL


 


Albumin/Globulin Ratio   0.7   














  01/13/21 01/14/21 01/14/21 Range/Units





  20:20 06:01 07:19 


 


WBC    2.9 L  (3.2-10.1)  x10-3/uL


 


RBC    4.68  (3.90-5.90)  x10(6)uL


 


Hgb    13.4  (12.9-17.7)  g/dL


 


Hct    39.2  (38.3-50.1)  %


 


MCV    83.7  (80.8-98.7)  fL


 


MCH    28.6  (27.0-33.3)  pg


 


MCHC    34.1  (28.7-35.3)  g/dL


 


RDW    15.2 H  (12.4-15.0)  %


 


Plt Count    102 L  (117-477)  x10(3)uL


 


MPV    9.1  (6.7-11.0)  fL


 


Neut % (Auto)     (40.3-71.8)  %


 


Lymph % (Auto)     (15.8-45.3)  %


 


Mono % (Auto)     (5.5-15.2)  %


 


Eos % (Auto)     (0.1-6.8)  %


 


Baso % (Auto)     (0.3-3.8)  %


 


Neut # (Auto)     (1.7-6.9)  x10-3/uL


 


Lymph # (Auto)     (0.5-4.5)  x10-3/uL


 


Mono # (Auto)     (0.0-1.2)  x10-3/uL


 


Eos # (Auto)     (0.0-0.6)  x10-3/uL


 


Baso # (Auto)     (0.0-0.3)  x10-3/uL


 


Add Manual Diff    Yes  


 


Neutrophils % (Manual)    68  (46-82)  %


 


Band Neutrophils %    2  (0-6)  %


 


Lymphocytes % (Manual)    22  (13-37)  %


 


Monocytes % (Manual)    8  (4-12)  %


 


Giant Platelets    Few  


 


Anisocytosis    Few  


 


APTT     (24.4-33.2)  SECONDS


 


POC ABG pH     (7.35-7.45)  pH


 


POC ABG pCO2     (35-48)  mmHg


 


POC ABG pO2     ()  mmHg


 


POC ABG HCO3     (21-28)  mmol/L


 


ABG O2 Sat (Calculated)     (94-98)  %


 


POC ABG Base Excess     (-2-3)  mmol/L


 


Terence Test     (PASS)  


 


O2 Delivery Device     


 


Sodium     (135-145)  mmol/L


 


Potassium     (3.5-5.3)  mmol/L


 


Chloride     (100-110)  mmol/L


 


Carbon Dioxide     (21-32)  mmol/L


 


BUN     (7-18)  mg/dL


 


Creatinine     (0.70-1.30)  mg/dL


 


Est Cr Clr Drug Dosing     mL/min


 


Estimated GFR (MDRD)     (>60)  


 


BUN/Creatinine Ratio     (9-20)  


 


Glucose     ()  mg/dL


 


POC Glucose  195 H  277 H   ()  mg/dL


 


Calcium     (8.6-10.2)  mg/dL


 


Total Bilirubin     (0.1-1.3)  mg/dL


 


AST     (5-25)  IU/L


 


ALT     (12-36)  U/L


 


Alkaline Phosphatase     ()  IU/L


 


C-Reactive Protein     (0.5-0.9)  mg/dL


 


Total Protein     (6.0-8.0)  g/dL


 


Albumin     (3.2-4.6)  g/dL


 


Globulin     g/dL


 


Albumin/Globulin Ratio     














  01/14/21 01/14/21 Range/Units





  07:19 11:35 


 


WBC    (3.2-10.1)  x10-3/uL


 


RBC    (3.90-5.90)  x10(6)uL


 


Hgb    (12.9-17.7)  g/dL


 


Hct    (38.3-50.1)  %


 


MCV    (80.8-98.7)  fL


 


MCH    (27.0-33.3)  pg


 


MCHC    (28.7-35.3)  g/dL


 


RDW    (12.4-15.0)  %


 


Plt Count    (117-477)  x10(3)uL


 


MPV    (6.7-11.0)  fL


 


Neut % (Auto)    (40.3-71.8)  %


 


Lymph % (Auto)    (15.8-45.3)  %


 


Mono % (Auto)    (5.5-15.2)  %


 


Eos % (Auto)    (0.1-6.8)  %


 


Baso % (Auto)    (0.3-3.8)  %


 


Neut # (Auto)    (1.7-6.9)  x10-3/uL


 


Lymph # (Auto)    (0.5-4.5)  x10-3/uL


 


Mono # (Auto)    (0.0-1.2)  x10-3/uL


 


Eos # (Auto)    (0.0-0.6)  x10-3/uL


 


Baso # (Auto)    (0.0-0.3)  x10-3/uL


 


Add Manual Diff    


 


Neutrophils % (Manual)    (46-82)  %


 


Band Neutrophils %    (0-6)  %


 


Lymphocytes % (Manual)    (13-37)  %


 


Monocytes % (Manual)    (4-12)  %


 


Giant Platelets    


 


Anisocytosis    


 


APTT    (24.4-33.2)  SECONDS


 


POC ABG pH    (7.35-7.45)  pH


 


POC ABG pCO2    (35-48)  mmHg


 


POC ABG pO2    ()  mmHg


 


POC ABG HCO3    (21-28)  mmol/L


 


ABG O2 Sat (Calculated)    (94-98)  %


 


POC ABG Base Excess    (-2-3)  mmol/L


 


Terence Test    (PASS)  


 


O2 Delivery Device    


 


Sodium  136   (135-145)  mmol/L


 


Potassium  3.8   (3.5-5.3)  mmol/L


 


Chloride  103  D   (100-110)  mmol/L


 


Carbon Dioxide  24   (21-32)  mmol/L


 


BUN  17   (7-18)  mg/dL


 


Creatinine  1.0   (0.70-1.30)  mg/dL


 


Est Cr Clr Drug Dosing  71.02   mL/min


 


Estimated GFR (MDRD)  > 60   (>60)  


 


BUN/Creatinine Ratio  17.0   (9-20)  


 


Glucose  311 H D   ()  mg/dL


 


POC Glucose   269 H  ()  mg/dL


 


Calcium  7.9 L   (8.6-10.2)  mg/dL


 


Total Bilirubin  0.6   (0.1-1.3)  mg/dL


 


AST  51 H   (5-25)  IU/L


 


ALT  31   (12-36)  U/L


 


Alkaline Phosphatase  82   ()  IU/L


 


C-Reactive Protein    (0.5-0.9)  mg/dL


 


Total Protein  6.6   (6.0-8.0)  g/dL


 


Albumin  2.5 L   (3.2-4.6)  g/dL


 


Globulin  4.1   g/dL


 


Albumin/Globulin Ratio  0.6   











Med Orders - Current: 


                               Current Medications





Acetaminophen (Tylenol)  650 mg PO Q4H PRN


   PRN Reason: Pain (Mild 1-3)/fever


   Last Admin: 01/13/21 20:52 Dose:  650 mg


   Documented by: 


Albuterol (Ventolin Hfa)  0 gm INH Q4H PRN


   PRN Reason: Wheezing


Atorvastatin Calcium (Lipitor)  40 mg PO BEDTIME [facility]


   Last Admin: 01/13/21 20:23 Dose:  40 mg


   Documented by: 


Clopidogrel Bisulfate (Plavix)  75 mg PO DAILY [facility]


   Last Admin: 01/14/21 09:03 Dose:  75 mg


   Documented by: 


Dexamethasone (Dexamethasone)  6 mg IVPUSH DAILY [facility]


   Stop: 01/23/21 17:16


   Last Admin: 01/14/21 09:02 Dose:  6 mg


   Documented by: 


Dextrose/Water (Dextrose 50% In Water)  50 ml IVPUSH ASDIRECTED PRN


   PRN Reason: Hypoglycemia


Donepezil HCl (Aricept)  5 mg PO BEDTIME [facility]


   Last Admin: 01/13/21 20:22 Dose:  5 mg


   Documented by: 


Glucagon (Glucagen)  1 mg IM ASDIRECTED PRN


   PRN Reason: Hypoglycemia


Sodium Chloride (Normal Saline)  1,000 mls @ 100 mls/hr IV ASDIRECTED [facility]


   Last Admin: 01/14/21 04:10 Dose:  100 mls/hr


   Documented by: 


Remdesivir 100 mg/ Sodium (Chloride)  100 mls @ 100 mls/hr IV Q24H [facility]


   Stop: 01/17/21 18:59


Insulin Glargine (Lantus Solostar)  20 units SUBCUT BID [facility]


   Last Admin: 01/14/21 09:04 Dose:  20 units


   Documented by: 


Insulin Human Lispro (Humalog)  0 unit SUBCUT TIDMEALS [facility]; Protocol


   Last Admin: 01/14/21 12:12 Dose:  6 units


   Documented by: 


Metoprolol Succinate (Toprol Xl)  25 mg PO DAILY [facility]


   Last Admin: 01/14/21 09:03 Dose:  25 mg


   Documented by: 


Nitroglycerin (Nitrostat)  0.4 mg SL ASDIRECTED PRN


   PRN Reason: Chest Pain


Ondansetron HCl (Zofran Odt)  4 mg PO Q6H PRN


   PRN Reason: nausea, able to take PO


Pantoprazole Sodium (Protonix***)  40 mg PO BEDTIME [facility]


   Last Admin: 01/13/21 20:23 Dose:  40 mg


   Documented by: 


Potassium Chloride (Klor-Con M20)  20 meq PO BID [facility]


   Last Admin: 01/14/21 09:03 Dose:  20 meq


   Documented by: 





Discontinued Medications





Glipizide (Glucotrol Xl)  10 mg PO BID [facility]


   Last Admin: 01/13/21 20:15 Dose:  Not Given


   Documented by: 


Remdesivir 200 mg/ Sodium (Chloride)  250 mls @ 200 mls/hr IV ONETIME ONE


   Stop: 01/13/21 21:01


   Last Admin: 01/13/21 20:23 Dose:  200 mls/hr


   Documented by: 


Insulin Glargine (Lantus Solostar)  20 units SUBCUT BID [facility]


   Last Admin: 01/13/21 20:26 Dose:  20 unit


   Documented by: 


Sitagliptin Phosphate (Januvia)  100 mg PO DAILY [facility]


   Last Admin: 01/14/21 09:03 Dose:  100 mg


   Documented by: 


Sodium Chloride (Saline Flush)  10 ml FLUSH ASDIRECTED PRN


   PRN Reason: Keep Vein Open











- Exam


Quality Assessment: Supplemental Oxygen (5L)


General: Alert, Oriented (person, place), Cooperative, No Acute Distress


Lungs: Clear to Auscultation, Normal Respiratory Effort, Decreased Breath Sounds

(BLL), Crackles (fine crackles bibasilar).  No: Wheezing


Cardiovascular: Regular Rate, Regular Rhythm


GI/Abdominal Exam: Soft, Non-Tender, No Distention, Abnormal Bowel Sounds 

(hyperactive, belching)


Extremities: No Pedal Edema


Peripheral Pulses: 2+: Radial (L), Radial (R)


Skin: Warm, Dry, Intact





Sepsis Event Note





- Evaluation


Sepsis Screening Result: Sepsis Risk





- Focused Exam


Vital Signs: 


                                   Vital Signs











  Temp Temp Pulse Pulse Resp BP BP


 


 01/14/21 09:03    93    131/74 


 


 01/14/21 08:00  97.3 F    93  20   131/74


 


 01/14/21 04:50   98.1 F   65  20   152/82 H














  Pulse Ox


 


 01/14/21 09:03 


 


 01/14/21 08:00  93 L


 


 01/14/21 04:50  92 L














- Problem List & Annotations


(1) COVID-19


SNOMED Code(s): 558926582


   Code(s): U07.1 - COVID-19   Status: Acute   Current Visit: Yes   





(2) Viral pneumonia


SNOMED Code(s): 04941452


   Code(s): J12.9 - VIRAL PNEUMONIA, UNSPECIFIED   Status: Acute   Current 

Visit: Yes   





(3) Hypoxic


SNOMED Code(s): 182555360


   Code(s): R09.02 - HYPOXEMIA   Status: Acute   Current Visit: Yes   





(4) Diabetes


SNOMED Code(s): 26634082


   Code(s): E11.9 - TYPE 2 DIABETES MELLITUS WITHOUT COMPLICATIONS   Status: 

Chronic   Current Visit: Yes   


Qualifiers: 


   Diabetes mellitus long term insulin use: with long term use 





(5) GERD (gastroesophageal reflux disease)


SNOMED Code(s): 480205526


   Code(s): K21.9 - GASTRO-ESOPHAGEAL REFLUX DISEASE WITHOUT ESOPHAGITIS   

Status: Chronic   Priority: Medium   Current Visit: No   


Qualifiers: 


   Esophagitis presence: with esophagitis 





(6) Neurocognitive deficits


SNOMED Code(s): 151752417


   Code(s): R29.818 - OTHER SYMPTOMS AND SIGNS INVOLVING THE NERVOUS SYSTEM; 

R41.89 - OTH SYMPTOMS AND SIGNS W COGNITIVE FUNCTIONS AND AWARENESS   Status: 

Chronic   Current Visit: Yes   





(7) Palliative care patient


SNOMED Code(s): 095935235, 341015526


   Code(s): Z51.5 - ENCOUNTER FOR PALLIATIVE CARE   Status: Chronic   Current 

Visit: Yes   





(8) CHF (congestive heart failure)


SNOMED Code(s): 24935456


   Code(s): I50.9 - HEART FAILURE, UNSPECIFIED   Status: Chronic   Current 

Visit: Yes   


Qualifiers: 


   Heart failure type: unspecified 


Annotation/Comment:: Echo in 04/2018, EF of 65%   





- Problem List Review


Problem List Initiated/Reviewed/Updated: Yes





- My Orders


Last 24 Hours: 


My Active Orders





01/13/21 Dinner


Consistent Carbohydrate Diet [DIET] 





01/13/21 16:55


Patient Status [ADT] Routine 


Blood Glucose Check, Bedside [RC] QIDACANDBED 


Oxygen Therapy [RC] PRN 


Up With Assistance [RC] ASDIRECTED 


Up to Chair [RC] ASDIRECTED 


Vital Signs [RC] Q4H 


Acetaminophen [TylenoL]   650 mg PO Q4H PRN 


Ondansetron [Zofran ODT]   4 mg PO Q6H PRN 


Saline Lock Insert [OM.PC] Routine 


Resuscitation Status Routine 





01/13/21 16:56


Antiembolic Hose [OM.PC] Per Unit Routine 





01/13/21 17:00


Sodium Chloride 0.9% [Normal Saline] 1,000 ml IV ASDIRECTED 





01/13/21 17:02


Dextrose 50% in Water   50 ml IVPUSH ASDIRECTED PRN 


Glucagon,Human Recombinant [GlucaGen]   1 mg IM ASDIRECTED PRN 


Isolation [COMM] Stat 





01/13/21 17:03


Albuterol [Ventolin HFA]   0 gm INH Q4H PRN 


Nitroglycerin [Nitrostat]   0.4 mg SL ASDIRECTED PRN 





01/13/21 17:04


RT Post Treatment Assessment [RC] Click to Edit 





01/13/21 17:08


Incentive Spirometry [RT Incentive Spirometry] [RC] Q1HWA 





01/13/21 17:15


dexAMETHasone   6 mg IVPUSH DAILY 





01/13/21 18:00


Insulin Lispro [HumaLOG]   See Protocol  SUBCUT TIDMEALS 





01/13/21 18:20


PROCALCITONIN Routine 





01/13/21 21:00


Donepezil [Aricept]   5 mg PO BEDTIME 


Pantoprazole [ProTONIX***]   40 mg PO BEDTIME 


Potassium Chloride [Klor-Con M20]   20 meq PO BID 


atorvaSTATin [Lipitor]   40 mg PO BEDTIME 





01/14/21 07:58


Insulin Glarg,Human.Rec.Analog [LantUS Solostar]   20 units SUBCUT BID 





01/14/21 09:00


Clopidogrel [Plavix]   75 mg PO DAILY 


Metoprolol Succinate [Toprol XL]   25 mg PO DAILY 





01/14/21 18:00


Remdesivir 100 mg   Sodium Chloride 0.9% [Normal Saline] 100 ml IV Q24H 





01/15/21 06:00


CBC WITH AUTO DIFF [HEME] DAILY 


HEPATIC FUNCTION PANEL,HFP [CHEM] DAILY 





01/16/21 06:00


CBC WITH AUTO DIFF [HEME] DAILY 


HEPATIC FUNCTION PANEL,HFP [CHEM] DAILY 





01/17/21 06:00


CBC WITH AUTO DIFF [HEME] DAILY 


HEPATIC FUNCTION PANEL,HFP [CHEM] DAILY 





01/18/21 06:00


CBC WITH AUTO DIFF [HEME] DAILY 


HEPATIC FUNCTION PANEL,HFP [CHEM] DAILY 





01/19/21 06:00


CBC WITH AUTO DIFF [HEME] DAILY 


HEPATIC FUNCTION PANEL,HFP [CHEM] DAILY 














- Plan


Plan:: 





1. COVID viral pneumonia: CBC 4.3 last night, 2.9 today. Cr 1.0, AST at 51, will

 continue to monitor, procalcitonin pending. Dexamethasone 6 mg IV daily, 

Remdesivir 200 mg given last night, will get 100 mg daily, 5 day course. Oxygen 

to keep sats >94% by nasal cannula. Repeat labs tomorrow. NS at 100 ml/hr, if 

having good oral intake will decrease or saline lock. 


2. Recurrent UTIs: noted by nursing that his urine is malodorous, UA ordered, 

will treat if needed. 


3. DM: Accuchecks qidac&hs, Consistent carb diet, Lantus substitute for Levemir 

20 units bid, Humalog sliding scale. Hold oral diabetic meds at this time.  


4. DVT: TEDs BLE, Plavix 75 mg daily, platelets are low, will continue to 

monitor, no Lovenox at this time.

## 2021-01-15 NOTE — PCM.PN
- General Info


Date of Service: 01/15/21


Subjective Update: 





Ramone is up in the chair and feeling better. Had high blood sugar last night but 

has trended down to 227 this morning. Drank a liter of water overnight, nurse 

just refilled his container. No nausea or vomiting. Breathing feels it is 

better. Does miss his wife. 





- Patient Data


Vitals - Most Recent: 


                                Last Vital Signs











Temp  97.6 F   01/15/21 09:00


 


Pulse  70   01/15/21 09:36


 


Resp  16   01/15/21 09:00


 


BP  115/74   01/15/21 09:36


 


Pulse Ox  96   01/15/21 09:00











Weight - Most Recent: 241 lb 14.4 oz


I&O - Last 24 Hours: 


                                 Intake & Output











 01/14/21 01/15/21 01/15/21





 22:59 06:59 14:59


 


Intake Total 1783 847 


 


Output Total 500 500 


 


Balance 1283 347 











Lab Results Last 24 Hours: 


                         Laboratory Results - last 24 hr











  01/14/21 01/14/21 01/14/21 Range/Units





  11:35 17:27 20:33 


 


WBC     (3.2-10.1)  x10-3/uL


 


RBC     (3.90-5.90)  x10(6)uL


 


Hgb     (12.9-17.7)  g/dL


 


Hct     (38.3-50.1)  %


 


MCV     (80.8-98.7)  fL


 


MCH     (27.0-33.3)  pg


 


MCHC     (28.7-35.3)  g/dL


 


RDW     (12.4-15.0)  %


 


Plt Count     (117-477)  x10(3)uL


 


MPV     (6.7-11.0)  fL


 


Neut % (Auto)     (40.3-71.8)  %


 


Lymph % (Auto)     (15.8-45.3)  %


 


Mono % (Auto)     (5.5-15.2)  %


 


Eos % (Auto)     (0.1-6.8)  %


 


Baso % (Auto)     (0.3-3.8)  %


 


Neut # (Auto)     (1.7-6.9)  x10-3/uL


 


Lymph # (Auto)     (0.5-4.5)  x10-3/uL


 


Mono # (Auto)     (0.0-1.2)  x10-3/uL


 


Eos # (Auto)     (0.0-0.6)  x10-3/uL


 


Baso # (Auto)     (0.0-0.3)  x10-3/uL


 


POC Glucose  269 H  347 H  425 H*  ()  mg/dL


 


Total Bilirubin     (0.1-1.3)  mg/dL


 


Direct Bilirubin     (0.10-0.20)  mg/dL


 


AST     (5-25)  IU/L


 


ALT     (12-36)  U/L


 


Alkaline Phosphatase     ()  IU/L


 


Total Protein     (6.0-8.0)  g/dL


 


Albumin     (3.2-4.6)  g/dL


 


Urine Color     (YELLOW)  


 


Urine Appearance     (CLEAR)  


 


Urine pH     (5.0-6.5)  


 


Ur Specific Gravity     (1.010-1.025)  


 


Urine Protein     (NEGATIVE)  mg/dL


 


Urine Glucose (UA)     (NORMAL)  mg/dL


 


Urine Ketones     (NEGATIVE)  mg/dL


 


Urine Occult Blood     (NEGATIVE)  


 


Urine Nitrite     (NEGATIVE)  


 


Urine Bilirubin     (NEGATIVE)  


 


Urine Urobilinogen     (NEGATIVE)  mg/dL


 


Ur Leukocyte Esterase     (NEGATIVE)  


 


Urine RBC     (0-5)  


 


Urine WBC     (0-5)  


 


Ur Squamous Epith Cells     (NS,R,O)  


 


Amorphous Sediment     


 


Urine Bacteria     (NS)  














  01/14/21 01/14/21 01/15/21 Range/Units





  21:55 22:00 01:41 


 


WBC     (3.2-10.1)  x10-3/uL


 


RBC     (3.90-5.90)  x10(6)uL


 


Hgb     (12.9-17.7)  g/dL


 


Hct     (38.3-50.1)  %


 


MCV     (80.8-98.7)  fL


 


MCH     (27.0-33.3)  pg


 


MCHC     (28.7-35.3)  g/dL


 


RDW     (12.4-15.0)  %


 


Plt Count     (117-477)  x10(3)uL


 


MPV     (6.7-11.0)  fL


 


Neut % (Auto)     (40.3-71.8)  %


 


Lymph % (Auto)     (15.8-45.3)  %


 


Mono % (Auto)     (5.5-15.2)  %


 


Eos % (Auto)     (0.1-6.8)  %


 


Baso % (Auto)     (0.3-3.8)  %


 


Neut # (Auto)     (1.7-6.9)  x10-3/uL


 


Lymph # (Auto)     (0.5-4.5)  x10-3/uL


 


Mono # (Auto)     (0.0-1.2)  x10-3/uL


 


Eos # (Auto)     (0.0-0.6)  x10-3/uL


 


Baso # (Auto)     (0.0-0.3)  x10-3/uL


 


POC Glucose   299 H  227 H  ()  mg/dL


 


Total Bilirubin     (0.1-1.3)  mg/dL


 


Direct Bilirubin     (0.10-0.20)  mg/dL


 


AST     (5-25)  IU/L


 


ALT     (12-36)  U/L


 


Alkaline Phosphatase     ()  IU/L


 


Total Protein     (6.0-8.0)  g/dL


 


Albumin     (3.2-4.6)  g/dL


 


Urine Color  Yellow    (YELLOW)  


 


Urine Appearance  Cloudy    (CLEAR)  


 


Urine pH  5.0    (5.0-6.5)  


 


Ur Specific Gravity  1.015    (1.010-1.025)  


 


Urine Protein  Negative    (NEGATIVE)  mg/dL


 


Urine Glucose (UA)  >1000 H    (NORMAL)  mg/dL


 


Urine Ketones  15 H    (NEGATIVE)  mg/dL


 


Urine Occult Blood  Negative    (NEGATIVE)  


 


Urine Nitrite  Negative    (NEGATIVE)  


 


Urine Bilirubin  Negative    (NEGATIVE)  


 


Urine Urobilinogen  Normal    (NEGATIVE)  mg/dL


 


Ur Leukocyte Esterase  Negative    (NEGATIVE)  


 


Urine RBC  0-5    (0-5)  


 


Urine WBC  0-5    (0-5)  


 


Ur Squamous Epith Cells  Occasional    (NS,R,O)  


 


Amorphous Sediment  Moderate    


 


Urine Bacteria  Moderate H    (NS)  














  01/15/21 01/15/21 Range/Units





  06:30 06:30 


 


WBC   7.1  (3.2-10.1)  x10-3/uL


 


RBC   4.36  (3.90-5.90)  x10(6)uL


 


Hgb   12.5 L  (12.9-17.7)  g/dL


 


Hct   36.5 L  (38.3-50.1)  %


 


MCV   83.7  (80.8-98.7)  fL


 


MCH   28.6  (27.0-33.3)  pg


 


MCHC   34.2  (28.7-35.3)  g/dL


 


RDW   15.6 H  (12.4-15.0)  %


 


Plt Count   135  (117-477)  x10(3)uL


 


MPV   9.1  (6.7-11.0)  fL


 


Neut % (Auto)   85.5 H  (40.3-71.8)  %


 


Lymph % (Auto)   8.9 L  (15.8-45.3)  %


 


Mono % (Auto)   5.5  (5.5-15.2)  %


 


Eos % (Auto)   0.0 L  (0.1-6.8)  %


 


Baso % (Auto)   0.1 L  (0.3-3.8)  %


 


Neut # (Auto)   6.1  (1.7-6.9)  x10-3/uL


 


Lymph # (Auto)   0.6  (0.5-4.5)  x10-3/uL


 


Mono # (Auto)   0.4  (0.0-1.2)  x10-3/uL


 


Eos # (Auto)   0.0  (0.0-0.6)  x10-3/uL


 


Baso # (Auto)   0.0  (0.0-0.3)  x10-3/uL


 


POC Glucose    ()  mg/dL


 


Total Bilirubin  0.3   (0.1-1.3)  mg/dL


 


Direct Bilirubin  0.13   (0.10-0.20)  mg/dL


 


AST  41 H D   (5-25)  IU/L


 


ALT  30   (12-36)  U/L


 


Alkaline Phosphatase  67   ()  IU/L


 


Total Protein  5.8 L   (6.0-8.0)  g/dL


 


Albumin  2.3 L   (3.2-4.6)  g/dL


 


Urine Color    (YELLOW)  


 


Urine Appearance    (CLEAR)  


 


Urine pH    (5.0-6.5)  


 


Ur Specific Gravity    (1.010-1.025)  


 


Urine Protein    (NEGATIVE)  mg/dL


 


Urine Glucose (UA)    (NORMAL)  mg/dL


 


Urine Ketones    (NEGATIVE)  mg/dL


 


Urine Occult Blood    (NEGATIVE)  


 


Urine Nitrite    (NEGATIVE)  


 


Urine Bilirubin    (NEGATIVE)  


 


Urine Urobilinogen    (NEGATIVE)  mg/dL


 


Ur Leukocyte Esterase    (NEGATIVE)  


 


Urine RBC    (0-5)  


 


Urine WBC    (0-5)  


 


Ur Squamous Epith Cells    (NS,R,O)  


 


Amorphous Sediment    


 


Urine Bacteria    (NS)  











Med Orders - Current: 


                               Current Medications





Acetaminophen (Tylenol)  650 mg PO Q4H PRN


   PRN Reason: Pain (Mild 1-3)/fever


   Last Admin: 01/15/21 02:27 Dose:  650 mg


   Documented by: 


Albuterol (Ventolin Hfa)  0 gm INH Q4H PRN


   PRN Reason: Wheezing


Atorvastatin Calcium (Lipitor)  40 mg PO BEDTIME [facility]


   Last Admin: 01/14/21 20:36 Dose:  40 mg


   Documented by: 


Clopidogrel Bisulfate (Plavix)  75 mg PO DAILY [facility]


   Last Admin: 01/15/21 09:36 Dose:  75 mg


   Documented by: 


Dexamethasone (Dexamethasone)  6 mg IVPUSH DAILY [facility]


   Stop: 01/23/21 17:16


   Last Admin: 01/15/21 08:16 Dose:  6 mg


   Documented by: 


Dextrose/Water (Dextrose 50% In Water)  50 ml IVPUSH ASDIRECTED PRN


   PRN Reason: Hypoglycemia


Donepezil HCl (Aricept)  5 mg PO BEDTIME [facility]


   Last Admin: 01/14/21 20:37 Dose:  5 mg


   Documented by: 


Glucagon (Glucagen)  1 mg IM ASDIRECTED PRN


   PRN Reason: Hypoglycemia


Sodium Chloride (Normal Saline)  1,000 mls @ 100 mls/hr IV ASDIRECTED [facility]


   Last Admin: 01/15/21 01:18 Dose:  100 mls/hr


   Documented by: 


Remdesivir 100 mg/ Sodium (Chloride)  100 mls @ 100 mls/hr IV Q24H [facility]


   Stop: 01/17/21 18:59


   Last Admin: 01/14/21 18:18 Dose:  100 mls/hr


   Documented by: 


Insulin Glargine (Lantus Solostar)  20 units SUBCUT BID [facility]


   Last Admin: 01/15/21 08:17 Dose:  20 units


   Documented by: 


Insulin Human Lispro (Humalog)  0 unit SUBCUT TIDMEALS [facility]; Protocol


   Last Admin: 01/15/21 08:13 Dose:  3 units


   Documented by: 


Metoprolol Succinate (Toprol Xl)  25 mg PO DAILY [facility]


   Last Admin: 01/15/21 09:36 Dose:  25 mg


   Documented by: 


Nitroglycerin (Nitrostat)  0.4 mg SL ASDIRECTED PRN


   PRN Reason: Chest Pain


Ondansetron HCl (Zofran Odt)  4 mg PO Q6H PRN


   PRN Reason: nausea, able to take PO


Pantoprazole Sodium (Protonix***)  40 mg PO BEDTIME [facility]


   Last Admin: 01/14/21 20:37 Dose:  40 mg


   Documented by: 


Potassium Chloride (Klor-Con M20)  20 meq PO BID [facility]


   Last Admin: 01/15/21 09:36 Dose:  20 meq


   Documented by: 


Sodium Chloride (Saline Flush)  10 ml FLUSH ASDIRECTED PRN


   PRN Reason: Keep Vein Open


   Last Admin: 01/14/21 19:18 Dose:  10 ml


   Documented by: 





Discontinued Medications





Glipizide (Glucotrol Xl)  10 mg PO BID [facility]


   Last Admin: 01/13/21 20:15 Dose:  Not Given


   Documented by: 


Remdesivir 200 mg/ Sodium (Chloride)  250 mls @ 200 mls/hr IV ONETIME ONE


   Stop: 01/13/21 21:01


   Last Admin: 01/13/21 20:23 Dose:  200 mls/hr


   Documented by: 


Insulin Glargine (Lantus Solostar)  20 units SUBCUT BID [facility]


   Last Admin: 01/13/21 20:26 Dose:  20 unit


   Documented by: 


Sitagliptin Phosphate (Januvia)  100 mg PO DAILY [facility]


   Last Admin: 01/14/21 09:03 Dose:  100 mg


   Documented by: 


Sodium Chloride (Saline Flush)  10 ml FLUSH ASDIRECTED PRN


   PRN Reason: Keep Vein Open











- Exam


Quality Assessment: Supplemental Oxygen


General: Alert, Oriented (person, place), Cooperative, No Acute Distress


Lungs: Clear to Auscultation, Normal Respiratory Effort, Decreased Breath Sounds

(bibasilar), Crackles (bibasilar).  No: Wheezing


Cardiovascular: Regular Rate, Regular Rhythm


GI/Abdominal Exam: Soft, Non-Tender, No Distention, Abnormal Bowel Sounds 

(hyperactive x4)


 (Male) Exam: Deferred


Extremities: No Pedal Edema


Peripheral Pulses: 2+: Radial (L), Radial (R)





Sepsis Event Note





- Evaluation


Sepsis Screening Result: No Definite Risk





- Focused Exam


Vital Signs: 


                                   Vital Signs











  Temp Temp Pulse Pulse Resp BP BP


 


 01/15/21 09:36    70    115/74 


 


 01/15/21 09:00   97.6 F   70  16   115/74


 


 01/15/21 05:00  96.5 F L    68  22 H   114/55 L


 


 01/15/21 01:56       


 


 01/15/21 00:00  96.7 F L    63  22 H   142/78 H














  Pulse Ox


 


 01/15/21 09:36 


 


 01/15/21 09:00  96


 


 01/15/21 05:00  96


 


 01/15/21 01:56  97


 


 01/15/21 00:00  97














- Problem List & Annotations


(1) COVID-19


SNOMED Code(s): 847189987


   Code(s): U07.1 - COVID-19   Status: Acute   Current Visit: Yes   





(2) Viral pneumonia


SNOMED Code(s): 70524159


   Code(s): J12.9 - VIRAL PNEUMONIA, UNSPECIFIED   Status: Acute   Current 

Visit: Yes   





(3) Hypoxic


SNOMED Code(s): 513964811


   Code(s): R09.02 - HYPOXEMIA   Status: Acute   Current Visit: Yes   





(4) Diabetes


SNOMED Code(s): 87603705


   Code(s): E11.9 - TYPE 2 DIABETES MELLITUS WITHOUT COMPLICATIONS   Status: 

Chronic   Current Visit: Yes   


Qualifiers: 


   Diabetes mellitus long term insulin use: with long term use 





(5) GERD (gastroesophageal reflux disease)


SNOMED Code(s): 743864109


   Code(s): K21.9 - GASTRO-ESOPHAGEAL REFLUX DISEASE WITHOUT ESOPHAGITIS   

Status: Chronic   Priority: Medium   Current Visit: No   


Qualifiers: 


   Esophagitis presence: with esophagitis 





(6) Neurocognitive deficits


SNOMED Code(s): 206923732


   Code(s): R29.818 - OTHER SYMPTOMS AND SIGNS INVOLVING THE NERVOUS SYSTEM; 

R41.89 - OTH SYMPTOMS AND SIGNS W COGNITIVE FUNCTIONS AND AWARENESS   Status: 

Chronic   Current Visit: Yes   





(7) Palliative care patient


SNOMED Code(s): 286831223, 990086268


   Code(s): Z51.5 - ENCOUNTER FOR PALLIATIVE CARE   Status: Chronic   Current 

Visit: Yes   





(8) CHF (congestive heart failure)


SNOMED Code(s): 86307738


   Code(s): I50.9 - HEART FAILURE, UNSPECIFIED   Status: Chronic   Current 

Visit: Yes   


Qualifiers: 


   Heart failure type: unspecified 


Annotation/Comment:: Echo in 04/2018, EF of 65%   





- Problem List Review


Problem List Initiated/Reviewed/Updated: Yes





- My Orders


Last 24 Hours: 


My Active Orders





01/14/21 18:00


Remdesivir 100 mg   Sodium Chloride 0.9% [Normal Saline] 100 ml IV Q24H 





01/14/21 19:00


Sodium Chloride 0.9% [Saline Flush]   10 ml FLUSH ASDIRECTED PRN 





01/14/21 21:25


CULTURE URINE [RM] Routine 





01/16/21 06:00


CBC WITH AUTO DIFF [HEME] DAILY 


HEPATIC FUNCTION PANEL,HFP [CHEM] DAILY 





01/16/21 08:01


CREATININE W/GFR [CHEM] DAILY 





01/17/21 06:00


CBC WITH AUTO DIFF [HEME] DAILY 


HEPATIC FUNCTION PANEL,HFP [CHEM] DAILY 





01/17/21 08:01


CREATININE W/GFR [CHEM] DAILY 





01/18/21 06:00


CBC WITH AUTO DIFF [HEME] DAILY 


HEPATIC FUNCTION PANEL,HFP [CHEM] DAILY 





01/18/21 08:01


CREATININE W/GFR [CHEM] DAILY 





01/19/21 06:00


CBC WITH AUTO DIFF [HEME] DAILY 


HEPATIC FUNCTION PANEL,HFP [CHEM] DAILY 





01/19/21 08:01


CREATININE W/GFR [CHEM] DAILY 














- Plan


Plan:: 





1. COVID viral pneumonia: CBC 7.1. Cr 1.0, AST at 41, will continue to monitor, 

procalcitonin pending. Dexamethasone & Remdesivir day 3/5. Oxygen to keep sats 

>94% by nasal cannula. Repeat labs tomorrow. 


2. Good oral intake, saline lock. 


3. DM: Accuchecks qidac&hs, Consistent carb diet, Lantus substitute for Levemir 

20 units bid, Humalog high dose sliding scale. Hold oral diabetic meds at this 

time.  


4. DVT: TEDs BLE, Plavix 75 mg daily, platelets are low, will continue to 

monitor, no Lovenox at this time.

## 2021-01-16 NOTE — PCM.PN
- General Info


Date of Service: 01/16/21


Subjective Update: 





Ramone slept better last night but not drinking very much, had low blood pressure 

this morning, held his metoprolol. Blood sugar was 80s this morning, held his 

morning Lantus dose, didn't eat much this morning. Having a lot of belching, 

keep bed at 30 degrees. Short of breath a little worse but weaned down to 1L 

now. 





- Patient Data


Vitals - Most Recent: 


                                Last Vital Signs











Temp  97.7 F   01/16/21 08:25


 


Pulse  68   01/16/21 08:25


 


Resp  16   01/16/21 08:25


 


BP  111/63   01/16/21 08:25


 


Pulse Ox  97   01/16/21 08:25











Weight - Most Recent: 241 lb 14.4 oz


I&O - Last 24 Hours: 


                                 Intake & Output











 01/15/21 01/16/21 01/16/21





 22:59 06:59 14:59


 


Intake Total 100  


 


Balance 100  











Lab Results Last 24 Hours: 


                         Laboratory Results - last 24 hr











  01/15/21 01/15/21 01/15/21 Range/Units





  06:30 11:33 16:44 


 


WBC     (3.2-10.1)  x10-3/uL


 


RBC     (3.90-5.90)  x10(6)uL


 


Hgb     (12.9-17.7)  g/dL


 


Hct     (38.3-50.1)  %


 


MCV     (80.8-98.7)  fL


 


MCH     (27.0-33.3)  pg


 


MCHC     (28.7-35.3)  g/dL


 


RDW     (12.4-15.0)  %


 


Plt Count     (117-477)  x10(3)uL


 


MPV     (6.7-11.0)  fL


 


Add Manual Diff     


 


Neutrophils % (Manual)     (46-82)  %


 


Band Neutrophils %     (0-6)  %


 


Lymphocytes % (Manual)     (13-37)  %


 


Monocytes % (Manual)     (4-12)  %


 


Microcytosis     


 


Creatinine     (0.70-1.30)  mg/dL


 


Est Cr Clr Drug Dosing     mL/min


 


Estimated GFR (MDRD)     (>60)  


 


POC Glucose  198 H  276 H  311 H  ()  mg/dL


 


Total Bilirubin     (0.1-1.3)  mg/dL


 


Direct Bilirubin     (0.10-0.20)  mg/dL


 


AST     (5-25)  IU/L


 


ALT     (12-36)  U/L


 


Alkaline Phosphatase     ()  IU/L


 


Total Protein     (6.0-8.0)  g/dL


 


Albumin     (3.2-4.6)  g/dL














  01/15/21 01/16/21 01/16/21 Range/Units





  20:18 06:30 06:50 


 


WBC     (3.2-10.1)  x10-3/uL


 


RBC     (3.90-5.90)  x10(6)uL


 


Hgb     (12.9-17.7)  g/dL


 


Hct     (38.3-50.1)  %


 


MCV     (80.8-98.7)  fL


 


MCH     (27.0-33.3)  pg


 


MCHC     (28.7-35.3)  g/dL


 


RDW     (12.4-15.0)  %


 


Plt Count     (117-477)  x10(3)uL


 


MPV     (6.7-11.0)  fL


 


Add Manual Diff     


 


Neutrophils % (Manual)     (46-82)  %


 


Band Neutrophils %     (0-6)  %


 


Lymphocytes % (Manual)     (13-37)  %


 


Monocytes % (Manual)     (4-12)  %


 


Microcytosis     


 


Creatinine   0.9   (0.70-1.30)  mg/dL


 


Est Cr Clr Drug Dosing   78.91   mL/min


 


Estimated GFR (MDRD)   > 60   (>60)  


 


POC Glucose  360 H    ()  mg/dL


 


Total Bilirubin    0.5  (0.1-1.3)  mg/dL


 


Direct Bilirubin    0.16  (0.10-0.20)  mg/dL


 


AST    49 H D  (5-25)  IU/L


 


ALT    38 H D  (12-36)  U/L


 


Alkaline Phosphatase    78  ()  IU/L


 


Total Protein    6.4  (6.0-8.0)  g/dL


 


Albumin    2.6 L  (3.2-4.6)  g/dL














  01/16/21 Range/Units





  06:50 


 


WBC  8.6  (3.2-10.1)  x10-3/uL


 


RBC  4.53  (3.90-5.90)  x10(6)uL


 


Hgb  12.9  (12.9-17.7)  g/dL


 


Hct  37.8 L  (38.3-50.1)  %


 


MCV  83.5  (80.8-98.7)  fL


 


MCH  28.4  (27.0-33.3)  pg


 


MCHC  34.0  (28.7-35.3)  g/dL


 


RDW  15.3 H  (12.4-15.0)  %


 


Plt Count  137  (117-477)  x10(3)uL


 


MPV  8.6  (6.7-11.0)  fL


 


Add Manual Diff  Yes  


 


Neutrophils % (Manual)  78  (46-82)  %


 


Band Neutrophils %  3  (0-6)  %


 


Lymphocytes % (Manual)  13  (13-37)  %


 


Monocytes % (Manual)  6  (4-12)  %


 


Microcytosis  Moderate H  


 


Creatinine   (0.70-1.30)  mg/dL


 


Est Cr Clr Drug Dosing   mL/min


 


Estimated GFR (MDRD)   (>60)  


 


POC Glucose   ()  mg/dL


 


Total Bilirubin   (0.1-1.3)  mg/dL


 


Direct Bilirubin   (0.10-0.20)  mg/dL


 


AST   (5-25)  IU/L


 


ALT   (12-36)  U/L


 


Alkaline Phosphatase   ()  IU/L


 


Total Protein   (6.0-8.0)  g/dL


 


Albumin   (3.2-4.6)  g/dL











Curt Results Last 24 Hours: 


                                  Microbiology











 01/14/21 21:25 Urine Culture - Preliminary





 Urine, Voided    MIXED POSITIVE MODESTO DAY 1











Med Orders - Current: 


                               Current Medications





Acetaminophen (Tylenol)  650 mg PO Q4H PRN


   PRN Reason: Pain (Mild 1-3)/fever


   Last Admin: 01/15/21 02:27 Dose:  650 mg


   Documented by: 


Albuterol (Ventolin Hfa)  0 gm INH Q4H PRN


   PRN Reason: Wheezing


Atorvastatin Calcium (Lipitor)  40 mg PO BEDTIME [facility]


   Last Admin: 01/15/21 20:25 Dose:  40 mg


   Documented by: 


Calcium Carbonate/Glycine (Tums)  500 mg PO Q2H PRN


   PRN Reason: Indigestion


   Last Admin: 01/16/21 10:36 Dose:  500 mg


   Documented by: 


Clopidogrel Bisulfate (Plavix)  75 mg PO DAILY [facility]


   Last Admin: 01/16/21 08:02 Dose:  75 mg


   Documented by: 


Dexamethasone (Dexamethasone)  6 mg IVPUSH DAILY [facility]


   Stop: 01/23/21 17:16


   Last Admin: 01/16/21 08:00 Dose:  6 mg


   Documented by: 


Dextrose/Water (Dextrose 50% In Water)  50 ml IVPUSH ASDIRECTED PRN


   PRN Reason: Hypoglycemia


Donepezil HCl (Aricept)  5 mg PO BEDTIME [facility]


   Last Admin: 01/15/21 20:25 Dose:  5 mg


   Documented by: 


Glucagon (Glucagen)  1 mg IM ASDIRECTED PRN


   PRN Reason: Hypoglycemia


Hydroxyzine HCl (Atarax)  25 mg PO BEDTIME PRN


   PRN Reason: Insomnia


   Last Admin: 01/15/21 21:37 Dose:  25 mg


   Documented by: 


Sodium Chloride (Normal Saline)  1,000 mls @ 75 mls/hr IV ASDIRECTED [facility]


   Last Admin: 01/16/21 10:29 Dose:  100 mls/hr


   Documented by: 


Remdesivir 100 mg/ Sodium (Chloride)  100 mls @ 100 mls/hr IV Q24H [facility]


   Stop: 01/17/21 18:59


   Last Admin: 01/15/21 17:21 Dose:  100 mls/hr


   Documented by: 


Insulin Glargine (Lantus Solostar)  20 units SUBCUT BID [facility]


   Last Admin: 01/16/21 09:36 Dose:  Not Given


   Documented by: 


Insulin Human Lispro (Humalog)  0 unit SUBCUT TIDMEALS [facility]; Protocol


   Last Admin: 01/16/21 07:20 Dose:  Not Given


   Documented by: 


Metoprolol Succinate (Toprol Xl)  25 mg PO DAILY [facility]


   Last Admin: 01/16/21 09:36 Dose:  Not Given


   Documented by: 


Nitroglycerin (Nitrostat)  0.4 mg SL ASDIRECTED PRN


   PRN Reason: Chest Pain


Ondansetron HCl (Zofran Odt)  4 mg PO Q6H PRN


   PRN Reason: nausea, able to take PO


Pantoprazole Sodium (Protonix***)  40 mg PO BEDTIME [facility]


   Last Admin: 01/15/21 20:25 Dose:  40 mg


   Documented by: 


Potassium Chloride (Klor-Con M20)  20 meq PO BID [facility]


   Last Admin: 01/16/21 08:01 Dose:  20 meq


   Documented by: 


Sodium Chloride (Saline Flush)  10 ml FLUSH ASDIRECTED PRN


   PRN Reason: Keep Vein Open


   Last Admin: 01/16/21 10:32 Dose:  10 ml


   Documented by: 





Discontinued Medications





Glipizide (Glucotrol Xl)  10 mg PO BID [facility]


   Last Admin: 01/13/21 20:15 Dose:  Not Given


   Documented by: 


Remdesivir 200 mg/ Sodium (Chloride)  250 mls @ 200 mls/hr IV ONETIME ONE


   Stop: 01/13/21 21:01


   Last Admin: 01/13/21 20:23 Dose:  200 mls/hr


   Documented by: 


Insulin Glargine (Lantus Solostar)  20 units SUBCUT BID [facility]


   Last Admin: 01/13/21 20:26 Dose:  20 unit


   Documented by: 


Sitagliptin Phosphate (Januvia)  100 mg PO DAILY [facility]


   Last Admin: 01/14/21 09:03 Dose:  100 mg


   Documented by: 


Sodium Chloride (Saline Flush)  10 ml FLUSH ASDIRECTED PRN


   PRN Reason: Keep Vein Open











- Exam


Quality Assessment: Supplemental Oxygen


General: Alert, Oriented (person), Cooperative, No Acute Distress


Neck: Trachea Midline


Lungs: Clear to Auscultation (Right lobes), Normal Respiratory Effort, Decreased

Breath Sounds (bibasilar), Crackles (LLL to midlung).  No: Wheezing


Cardiovascular: Regular Rate, Regular Rhythm


GI/Abdominal Exam: Normal Bowel Sounds, Soft, Non-Tender, No Distention, Other 

(belching for a few minutes continuous during exam)


Extremities: No Pedal Edema


Peripheral Pulses: 2+: Radial (L), Radial (R)





Sepsis Event Note





- Evaluation


Sepsis Screening Result: No Definite Risk





- Focused Exam


Vital Signs: 


                                   Vital Signs











  Temp Pulse Resp BP Pulse Ox Pulse Ox


 


 01/16/21 08:25  97.7 F  68  16  111/63  97 


 


 01/16/21 07:35  97.6 F  64  16  88/42 L  97 


 


 01/16/21 05:00  96.9 F  64  18  112/61  96 


 


 01/16/21 01:30  96.4 F L  68  18  104/57 L  97 


 


 01/16/21 00:00       97














- Problem List & Annotations


(1) COVID-19


SNOMED Code(s): 468131532


   Code(s): U07.1 - COVID-19   Status: Acute   Current Visit: Yes   





(2) Viral pneumonia


SNOMED Code(s): 04654354


   Code(s): J12.9 - VIRAL PNEUMONIA, UNSPECIFIED   Status: Acute   Current 

Visit: Yes   





(3) Hypoxic


SNOMED Code(s): 991214942


   Code(s): R09.02 - HYPOXEMIA   Status: Acute   Current Visit: Yes   





(4) Diabetes


SNOMED Code(s): 76798547


   Code(s): E11.9 - TYPE 2 DIABETES MELLITUS WITHOUT COMPLICATIONS   Status: 

Chronic   Current Visit: Yes   


Qualifiers: 


   Diabetes mellitus long term insulin use: with long term use 





(5) GERD (gastroesophageal reflux disease)


SNOMED Code(s): 357365548


   Code(s): K21.9 - GASTRO-ESOPHAGEAL REFLUX DISEASE WITHOUT ESOPHAGITIS   

Status: Chronic   Priority: Medium   Current Visit: No   


Qualifiers: 


   Esophagitis presence: with esophagitis 





(6) Neurocognitive deficits


SNOMED Code(s): 340893505


   Code(s): R29.818 - OTHER SYMPTOMS AND SIGNS INVOLVING THE NERVOUS SYSTEM; 

R41.89 - OTH SYMPTOMS AND SIGNS W COGNITIVE FUNCTIONS AND AWARENESS   Status: 

Chronic   Current Visit: Yes   





(7) Palliative care patient


SNOMED Code(s): 805984192, 835817879


   Code(s): Z51.5 - ENCOUNTER FOR PALLIATIVE CARE   Status: Chronic   Current 

Visit: Yes   





(8) CHF (congestive heart failure)


SNOMED Code(s): 06409137


   Code(s): I50.9 - HEART FAILURE, UNSPECIFIED   Status: Chronic   Current 

Visit: Yes   


Qualifiers: 


   Heart failure type: unspecified 


Annotation/Comment:: Echo in 04/2018, EF of 65%   





- Problem List Review


Problem List Initiated/Reviewed/Updated: Yes





- My Orders


Last 24 Hours: 


My Active Orders





01/15/21 11:26


Convert IV to Peripheral Lock [Convert IV to Saline Lock] [OM.PC] Routine 





01/15/21 21:17


hydrOXYzine HCL [Atarax]   25 mg PO BEDTIME PRN 





01/16/21 10:24


CXR [Chest 1V Frontal] [CR] Routine 


Calcium Carbonate [Tums]   500 mg PO Q2H PRN 





01/17/21 06:00


CBC WITH AUTO DIFF [HEME] DAILY 





01/17/21 10:35


COMPREHENSIVE METABOLIC PN,CMP [CHEM] DAILY 





01/18/21 06:00


CBC WITH AUTO DIFF [HEME] DAILY 





01/18/21 10:35


COMPREHENSIVE METABOLIC PN,CMP [CHEM] DAILY 





01/19/21 06:00


CBC WITH AUTO DIFF [HEME] DAILY 





01/19/21 10:35


COMPREHENSIVE METABOLIC PN,CMP [CHEM] DAILY 














- Plan


Plan:: 





1. COVID viral pneumonia: CBC 8.6. Cr 0.9, AST at 49, will continue to monitor, 

procalcitonin pending. Dexamethasone & Remdesivir day 4/5. Oxygen to keep sats 

>94% by nasal cannula. Repeat labs tomorrow. 


2. Decreased oral intake, NS at 75ml/hr. 


3. DM: Accuchecks qidac&hs, Consistent carb diet, Lantus substitute for Levemir 

20 units bid, held morning dose due to decreased appetite, Humalog decrease to 

medium dose sliding scale. Hold oral diabetic meds at this time.  


4. DVT: TEDs BLE, Plavix 75 mg daily, platelets are low, will continue to 

monitor, no Lovenox at this time. 


5. Dyspepsia: Tums 500 mg q2h as needed, keep head of bed at 30 degrees.

## 2021-01-17 NOTE — PCM.PN
- General Info


Date of Service: 01/17/21


Subjective Update: 





Had a restless night, more delirious. Weaned down to 1L. Not eating or drinking 

very well. Discontinue Hydroxyzine since he slept so much during the day 

yesterday. 





- Patient Data


Vitals - Most Recent: 


                                Last Vital Signs











Temp  98.7 F   01/17/21 05:30


 


Pulse  81   01/17/21 07:00


 


Resp  18   01/17/21 07:00


 


BP  100/45 L  01/17/21 05:30


 


Pulse Ox  96   01/17/21 07:00











Weight - Most Recent: 241 lb 14.4 oz


I&O - Last 24 Hours: 


                                 Intake & Output











 01/16/21 01/17/21 01/17/21





 22:59 06:59 14:59


 


Intake Total 926 616 


 


Balance 926 616 











Lab Results Last 24 Hours: 


                         Laboratory Results - last 24 hr











  01/13/21 01/16/21 01/16/21 Range/Units





  18:20 06:45 12:10 


 


WBC     (3.2-10.1)  x10-3/uL


 


RBC     (3.90-5.90)  x10(6)uL


 


Hgb     (12.9-17.7)  g/dL


 


Hct     (38.3-50.1)  %


 


MCV     (80.8-98.7)  fL


 


MCH     (27.0-33.3)  pg


 


MCHC     (28.7-35.3)  g/dL


 


RDW     (12.4-15.0)  %


 


Plt Count     (117-477)  x10(3)uL


 


MPV     (6.7-11.0)  fL


 


Neut % (Auto)     (40.3-71.8)  %


 


Lymph % (Auto)     (15.8-45.3)  %


 


Mono % (Auto)     (5.5-15.2)  %


 


Eos % (Auto)     (0.1-6.8)  %


 


Baso % (Auto)     (0.3-3.8)  %


 


Neut # (Auto)     (1.7-6.9)  x10-3/uL


 


Lymph # (Auto)     (0.5-4.5)  x10-3/uL


 


Mono # (Auto)     (0.0-1.2)  x10-3/uL


 


Eos # (Auto)     (0.0-0.6)  x10-3/uL


 


Baso # (Auto)     (0.0-0.3)  x10-3/uL


 


Sodium     (135-145)  mmol/L


 


Potassium     (3.5-5.3)  mmol/L


 


Chloride     (100-110)  mmol/L


 


Carbon Dioxide     (21-32)  mmol/L


 


BUN     (7-18)  mg/dL


 


Creatinine     (0.70-1.30)  mg/dL


 


Est Cr Clr Drug Dosing     mL/min


 


Estimated GFR (MDRD)     (>60)  


 


BUN/Creatinine Ratio     (9-20)  


 


Glucose     ()  mg/dL


 


POC Glucose   84  121 H  ()  mg/dL


 


Calcium     (8.6-10.2)  mg/dL


 


Total Bilirubin     (0.1-1.3)  mg/dL


 


AST     (5-25)  IU/L


 


ALT     (12-36)  U/L


 


Alkaline Phosphatase     ()  IU/L


 


Total Protein     (6.0-8.0)  g/dL


 


Albumin     (3.2-4.6)  g/dL


 


Globulin     g/dL


 


Albumin/Globulin Ratio     


 


Procalcitonin  0.16 H    (0.00-0.08)  ng/mL














  01/16/21 01/16/21 01/17/21 Range/Units





  17:08 21:09 06:15 


 


WBC    8.5  (3.2-10.1)  x10-3/uL


 


RBC    4.63  (3.90-5.90)  x10(6)uL


 


Hgb    13.3  (12.9-17.7)  g/dL


 


Hct    38.6  (38.3-50.1)  %


 


MCV    83.4  (80.8-98.7)  fL


 


MCH    28.6  (27.0-33.3)  pg


 


MCHC    34.3  (28.7-35.3)  g/dL


 


RDW    15.2 H  (12.4-15.0)  %


 


Plt Count    140  (117-477)  x10(3)uL


 


MPV    8.6  (6.7-11.0)  fL


 


Neut % (Auto)    84.3 H  (40.3-71.8)  %


 


Lymph % (Auto)    9.9 L  (15.8-45.3)  %


 


Mono % (Auto)    5.7  (5.5-15.2)  %


 


Eos % (Auto)    0.0 L  (0.1-6.8)  %


 


Baso % (Auto)    0.1 L  (0.3-3.8)  %


 


Neut # (Auto)    7.2 H  (1.7-6.9)  x10-3/uL


 


Lymph # (Auto)    0.8  (0.5-4.5)  x10-3/uL


 


Mono # (Auto)    0.5  (0.0-1.2)  x10-3/uL


 


Eos # (Auto)    0.0  (0.0-0.6)  x10-3/uL


 


Baso # (Auto)    0.0  (0.0-0.3)  x10-3/uL


 


Sodium     (135-145)  mmol/L


 


Potassium     (3.5-5.3)  mmol/L


 


Chloride     (100-110)  mmol/L


 


Carbon Dioxide     (21-32)  mmol/L


 


BUN     (7-18)  mg/dL


 


Creatinine     (0.70-1.30)  mg/dL


 


Est Cr Clr Drug Dosing     mL/min


 


Estimated GFR (MDRD)     (>60)  


 


BUN/Creatinine Ratio     (9-20)  


 


Glucose     ()  mg/dL


 


POC Glucose  282 H  366 H   ()  mg/dL


 


Calcium     (8.6-10.2)  mg/dL


 


Total Bilirubin     (0.1-1.3)  mg/dL


 


AST     (5-25)  IU/L


 


ALT     (12-36)  U/L


 


Alkaline Phosphatase     ()  IU/L


 


Total Protein     (6.0-8.0)  g/dL


 


Albumin     (3.2-4.6)  g/dL


 


Globulin     g/dL


 


Albumin/Globulin Ratio     


 


Procalcitonin     (0.00-0.08)  ng/mL














  01/17/21 Range/Units





  06:15 


 


WBC   (3.2-10.1)  x10-3/uL


 


RBC   (3.90-5.90)  x10(6)uL


 


Hgb   (12.9-17.7)  g/dL


 


Hct   (38.3-50.1)  %


 


MCV   (80.8-98.7)  fL


 


MCH   (27.0-33.3)  pg


 


MCHC   (28.7-35.3)  g/dL


 


RDW   (12.4-15.0)  %


 


Plt Count   (117-477)  x10(3)uL


 


MPV   (6.7-11.0)  fL


 


Neut % (Auto)   (40.3-71.8)  %


 


Lymph % (Auto)   (15.8-45.3)  %


 


Mono % (Auto)   (5.5-15.2)  %


 


Eos % (Auto)   (0.1-6.8)  %


 


Baso % (Auto)   (0.3-3.8)  %


 


Neut # (Auto)   (1.7-6.9)  x10-3/uL


 


Lymph # (Auto)   (0.5-4.5)  x10-3/uL


 


Mono # (Auto)   (0.0-1.2)  x10-3/uL


 


Eos # (Auto)   (0.0-0.6)  x10-3/uL


 


Baso # (Auto)   (0.0-0.3)  x10-3/uL


 


Sodium  142  (135-145)  mmol/L


 


Potassium  3.6  (3.5-5.3)  mmol/L


 


Chloride  105  (100-110)  mmol/L


 


Carbon Dioxide  27  (21-32)  mmol/L


 


BUN  15  (7-18)  mg/dL


 


Creatinine  1.0  (0.70-1.30)  mg/dL


 


Est Cr Clr Drug Dosing  71.02  mL/min


 


Estimated GFR (MDRD)  > 60  (>60)  


 


BUN/Creatinine Ratio  15.0  (9-20)  


 


Glucose  121 H D  ()  mg/dL


 


POC Glucose   ()  mg/dL


 


Calcium  7.9 L  (8.6-10.2)  mg/dL


 


Total Bilirubin  0.7  (0.1-1.3)  mg/dL


 


AST  65 H D  (5-25)  IU/L


 


ALT  63 H D  (12-36)  U/L


 


Alkaline Phosphatase  99  ()  IU/L


 


Total Protein  6.4  (6.0-8.0)  g/dL


 


Albumin  2.6 L  (3.2-4.6)  g/dL


 


Globulin  3.8  g/dL


 


Albumin/Globulin Ratio  0.7  


 


Procalcitonin   (0.00-0.08)  ng/mL











Curt Results Last 24 Hours: 


                                  Microbiology











 01/14/21 21:25 Urine Culture - Preliminary





 Urine, Voided    MIXED POSITIVE MODESTO DAY 1











Med Orders - Current: 


                               Current Medications





Acetaminophen (Tylenol)  650 mg PO Q4H PRN


   PRN Reason: Pain (Mild 1-3)/fever


   Last Admin: 01/15/21 02:27 Dose:  650 mg


   Documented by: 


Albuterol (Ventolin Hfa)  0 gm INH Q4H PRN


   PRN Reason: Wheezing


Atorvastatin Calcium (Lipitor)  40 mg PO BEDTIME [facility]


   Last Admin: 01/16/21 20:51 Dose:  40 mg


   Documented by: 


Budesonide (Pulmicort)  0.5 mg NEB BIDRT [facility]


   Last Admin: 01/17/21 06:30 Dose:  0.5 mg


   Documented by: 


Calcium Carbonate/Glycine (Tums)  500 mg PO Q2H PRN


   PRN Reason: Indigestion


   Last Admin: 01/16/21 10:36 Dose:  500 mg


   Documented by: 


Clopidogrel Bisulfate (Plavix)  75 mg PO DAILY [facility]


   Last Admin: 01/17/21 08:59 Dose:  75 mg


   Documented by: 


Dextrose/Water (Dextrose 50% In Water)  50 ml IVPUSH ASDIRECTED PRN


   PRN Reason: Hypoglycemia


Donepezil HCl (Aricept)  5 mg PO BEDTIME [facility]


   Last Admin: 01/16/21 20:51 Dose:  5 mg


   Documented by: 


Glucagon (Glucagen)  1 mg IM ASDIRECTED PRN


   PRN Reason: Hypoglycemia


Sodium Chloride (Normal Saline)  1,000 mls @ 75 mls/hr IV ASDIRECTED [facility]


   Last Infusion: 01/17/21 09:56 Dose:  Infused


   Documented by: 


Remdesivir 100 mg/ Sodium (Chloride)  100 mls @ 100 mls/hr IV Q24H [facility]


   Stop: 01/17/21 18:59


   Last Admin: 01/16/21 17:02 Dose:  100 mls/hr


   Documented by: 


Insulin Glargine (Lantus Solostar)  20 units SUBCUT BID [facility]


   Last Admin: 01/17/21 09:21 Dose:  Not Given


   Documented by: 


Insulin Human Lispro (Humalog)  0 unit SUBCUT TIDMEALS [facility]; Protocol


   Last Admin: 01/17/21 07:29 Dose:  Not Given


   Documented by: 


Metoprolol Succinate (Toprol Xl)  25 mg PO DAILY [facility]


   Last Admin: 01/16/21 09:36 Dose:  Not Given


   Documented by: 


Nitroglycerin (Nitrostat)  0.4 mg SL ASDIRECTED PRN


   PRN Reason: Chest Pain


Ondansetron HCl (Zofran Odt)  4 mg PO Q6H PRN


   PRN Reason: nausea, able to take PO


Pantoprazole Sodium (Protonix***)  40 mg PO BEDTIME [facility]


   Last Admin: 01/16/21 20:51 Dose:  40 mg


   Documented by: 


Potassium Chloride (Klor-Con M20)  20 meq PO BID [facility]


   Last Admin: 01/17/21 08:59 Dose:  20 meq


   Documented by: 


Sodium Chloride (Saline Flush)  10 ml FLUSH ASDIRECTED PRN


   PRN Reason: Keep Vein Open


   Last Admin: 01/16/21 18:05 Dose:  10 ml


   Documented by: 





Discontinued Medications





Dexamethasone (Dexamethasone)  6 mg IVPUSH DAILY [facility]


   Stop: 01/23/21 17:16


   Last Admin: 01/17/21 08:58 Dose:  6 mg


   Documented by: 


Glipizide (Glucotrol Xl)  10 mg PO BID [facility]


   Last Admin: 01/13/21 20:15 Dose:  Not Given


   Documented by: 


Hydroxyzine HCl (Atarax)  25 mg PO BEDTIME PRN


   PRN Reason: Insomnia


   Last Admin: 01/15/21 21:37 Dose:  25 mg


   Documented by: 


Remdesivir 200 mg/ Sodium (Chloride)  250 mls @ 200 mls/hr IV ONETIME ONE


   Stop: 01/13/21 21:01


   Last Admin: 01/13/21 20:23 Dose:  200 mls/hr


   Documented by: 


Insulin Glargine (Lantus Solostar)  20 units SUBCUT BID [facility]


   Last Admin: 01/13/21 20:26 Dose:  20 unit


   Documented by: 


Sitagliptin Phosphate (Januvia)  100 mg PO DAILY [facility]


   Last Admin: 01/14/21 09:03 Dose:  100 mg


   Documented by: 


Sodium Chloride (Saline Flush)  10 ml FLUSH ASDIRECTED PRN


   PRN Reason: Keep Vein Open











- Exam


General: Alert, No Acute Distress, Other (More confused)


Lungs: Clear to Auscultation, Normal Respiratory Effort, Decreased Breath Sounds

(bibasilar), Crackles (fine crackles in right base).  No: Wheezing


Cardiovascular: Regular Rate


GI/Abdominal Exam: Normal Bowel Sounds, Soft, Non-Tender, No Distention, Other 

(Belching)


Extremities: No Pedal Edema


Psy/Mental Status: Agitated





Sepsis Event Note





- Evaluation


Sepsis Screening Result: No Definite Risk





- Focused Exam


Vital Signs: 


                                   Vital Signs











  Temp Pulse Resp BP Pulse Ox Pulse Ox


 


 01/17/21 07:00   81  18   96 


 


 01/17/21 06:30   70     95


 


 01/17/21 05:30  98.7 F  70  18  100/45 L  95 


 


 01/17/21 01:00  98 F  77  18  110/60  93 L 


 


 01/17/21 00:00       92 L














- Problem List & Annotations


(1) COVID-19


SNOMED Code(s): 670111378


   Code(s): U07.1 - COVID-19   Status: Acute   Current Visit: Yes   





(2) Viral pneumonia


SNOMED Code(s): 79228563


   Code(s): J12.9 - VIRAL PNEUMONIA, UNSPECIFIED   Status: Acute   Current 

Visit: Yes   





(3) Hypoxic


SNOMED Code(s): 511839940


   Code(s): R09.02 - HYPOXEMIA   Status: Acute   Current Visit: Yes   





(4) Diabetes


SNOMED Code(s): 25424186


   Code(s): E11.9 - TYPE 2 DIABETES MELLITUS WITHOUT COMPLICATIONS   Status: 

Chronic   Current Visit: Yes   


Qualifiers: 


   Diabetes mellitus long term insulin use: with long term use 





(5) GERD (gastroesophageal reflux disease)


SNOMED Code(s): 959112366


   Code(s): K21.9 - GASTRO-ESOPHAGEAL REFLUX DISEASE WITHOUT ESOPHAGITIS   

Status: Chronic   Priority: Medium   Current Visit: No   


Qualifiers: 


   Esophagitis presence: with esophagitis 





(6) Neurocognitive deficits


SNOMED Code(s): 342199004


   Code(s): R29.818 - OTHER SYMPTOMS AND SIGNS INVOLVING THE NERVOUS SYSTEM; 

R41.89 - OTH SYMPTOMS AND SIGNS W COGNITIVE FUNCTIONS AND AWARENESS   Status: 

Chronic   Current Visit: Yes   





(7) Palliative care patient


SNOMED Code(s): 942711959, 488251921


   Code(s): Z51.5 - ENCOUNTER FOR PALLIATIVE CARE   Status: Chronic   Current 

Visit: Yes   





(8) CHF (congestive heart failure)


SNOMED Code(s): 42571901


   Code(s): I50.9 - HEART FAILURE, UNSPECIFIED   Status: Chronic   Current Visit

: Yes   


Qualifiers: 


   Heart failure type: unspecified 


Annotation/Comment:: Echo in 04/2018, EF of 65%   





(9) Delirium with dementia


SNOMED Code(s): 257627255


   Code(s): R41.0 - DISORIENTATION, UNSPECIFIED   Status: Acute   Current Visit:

Yes   Annotation/Comment:: Possibly secondary to Dexamethasone, will 

discontinue.    





- Problem List Review


Problem List Initiated/Reviewed/Updated: Yes





- My Orders


Last 24 Hours: 


My Active Orders





01/16/21 10:24


Calcium Carbonate [Tums]   500 mg PO Q2H PRN 





01/16/21 11:40


Head of Bed Elevation [RC] ASDIRECTED 





01/16/21 12:41


CXR [Chest 1V Frontal] [CR] Routine 





01/16/21 16:16


RT Aerosol Therapy [RC] .PRN 





01/16/21 21:00


Budesonide [Pulmicort]   0.5 mg NEB BIDRT 





01/18/21 06:00


OT Evaluation and Treatment [CONS] Routine 


PT Evaluation and Treatment [CONS] Routine 


CBC WITH AUTO DIFF [HEME] DAILY 


COMPREHENSIVE METABOLIC PN,CMP [CHEM] DAILY 





01/19/21 06:00


CBC WITH AUTO DIFF [HEME] DAILY 


COMPREHENSIVE METABOLIC PN,CMP [CHEM] DAILY 














- Plan


Plan:: 





1. COVID viral pneumonia: Labs stable. Procalcitonin <2 so low likelihood of 

sepsis. Dexamethasone discontinued. Remdesivir day 5/5. Oxygen to keep sats >94%

 by nasal cannula. He is at 1 liter now. Repeat labs tomorrow. 


2. Decreased oral intake, NS at 75ml/hr. 


3. DM: Accuchecks qidac&hs, Consistent carb diet, Lantus substitute for Levemir 

20 units bid, held due to decreased appetite, Humalog decrease to medium dose 

sliding scale. Continue to hold oral diabetic meds at this time.  


4. DVT: TEDs BLE, Plavix 75 mg daily, platelets now normal, will continue to 

monitor. 


5. Dyspepsia: Tums 500 mg q2h as needed, keep head of bed at 30 degrees.


6. Delirium with dementia: Most likely secondary to steroids, discontinued. 

Symptoms are mild at this point will NOT do Haldol at this time. Will have 

someone sit with him, have asked the wife to come in.

## 2021-01-18 NOTE — PCM.SN.2
- Free Text/Narrative


Note: 





ANESTHESIA SERVICES








Date: 01/18/2021


Time: 1255 to 1312








Dx: COVID 19 positive with Increasing Shortness of Breath, Poor Peripheral 

Venous Access


Rx: Obtain Peripheral Venous Access





Procedure: Placement of Peripheral Venous Access Catheter





I was called to the [facility] wing for placement of an IV after failed nursing 

attempts.  I had proper PPE on for this situation.  I attempted once with vein 

rupture on his right upper arm.  I then found another superficial vein in his 

very upper left medial arm.  I prepped the area with ChloraPrep and allowed it 

to dry. I then inserted and advanced a BD Insyte [employer] BC Winged 20 GA. X 

1.16 IN. catheter times one attempt.  It was flushed with 13 ml's of normal 

saline without complication and an OP-Site dressing was applied.  The patient 

tolerated this procedure well.





Fadi Tse CRNA GOLDEN

## 2021-01-18 NOTE — PCM.PN
- General Info


Date of Service: 01/18/21


Subjective Update: 





Ramone did better overnight, was incontinent of urine this morning, had bowel 

movement. Did eat 50% breakfast and drank about 600 ml of water. Was tired and 

went back to bed this morning. He was arousable, stated he was just resting. 

Denies any chest pain, nausea, vomiting. No diarrhea. Oxygen maintaining at 93-

95% on 2L. I spoke with his daughter Ladi last night, he has been having a 

marked decline in his memory since about June, had MRI head showed chronic small

vessel ischemic disease, incidental finding of metal foreign body in left 

frontal scalp. Has not had cognitive testing done, but was placed on Aricept. He

has been falling more at home, family and his wife Hiral are both concerned 

that he would be safe to return home. He and his wife have been  for a 

couple years, his first wife passed away from cancer a few years ago. Hiral 

is petite lady, concerned that she can't get him up if he continues to fall at 

home. 





- Patient Data


Vitals - Most Recent: 


                                Last Vital Signs











Temp  97.8 F   01/18/21 08:15


 


Pulse  62   01/18/21 08:22


 


Resp  22 H  01/18/21 08:15


 


BP  134/68   01/18/21 08:22


 


Pulse Ox  93 L  01/18/21 08:15











Weight - Most Recent: 241 lb 14.4 oz


Lab Results Last 24 Hours: 


                         Laboratory Results - last 24 hr











  01/17/21 01/17/21 01/17/21 Range/Units





  12:18 17:15 20:53 


 


WBC     (3.2-10.1)  x10-3/uL


 


RBC     (3.90-5.90)  x10(6)uL


 


Hgb     (12.9-17.7)  g/dL


 


Hct     (38.3-50.1)  %


 


MCV     (80.8-98.7)  fL


 


MCH     (27.0-33.3)  pg


 


MCHC     (28.7-35.3)  g/dL


 


RDW     (12.4-15.0)  %


 


Plt Count     (117-477)  x10(3)uL


 


MPV     (6.7-11.0)  fL


 


Add Manual Diff     


 


Neutrophils % (Manual)     (46-82)  %


 


Lymphocytes % (Manual)     (13-37)  %


 


Monocytes % (Manual)     (4-12)  %


 


Anisocytosis     


 


Microcytosis     


 


Sodium     (135-145)  mmol/L


 


Potassium     (3.5-5.3)  mmol/L


 


Chloride     (100-110)  mmol/L


 


Carbon Dioxide     (21-32)  mmol/L


 


BUN     (7-18)  mg/dL


 


Creatinine     (0.70-1.30)  mg/dL


 


Est Cr Clr Drug Dosing     mL/min


 


Estimated GFR (MDRD)     (>60)  


 


BUN/Creatinine Ratio     (9-20)  


 


Glucose     ()  mg/dL


 


POC Glucose  150 H  205 H  272 H  ()  mg/dL


 


Calcium     (8.6-10.2)  mg/dL


 


Total Bilirubin     (0.1-1.3)  mg/dL


 


AST     (5-25)  IU/L


 


ALT     (12-36)  U/L


 


Alkaline Phosphatase     ()  IU/L


 


Total Protein     (6.0-8.0)  g/dL


 


Albumin     (3.2-4.6)  g/dL


 


Globulin     g/dL


 


Albumin/Globulin Ratio     














  01/18/21 01/18/21 01/18/21 Range/Units





  06:38 07:51 07:51 


 


WBC   9.7   (3.2-10.1)  x10-3/uL


 


RBC   4.72   (3.90-5.90)  x10(6)uL


 


Hgb   13.3   (12.9-17.7)  g/dL


 


Hct   39.7   (38.3-50.1)  %


 


MCV   84.2   (80.8-98.7)  fL


 


MCH   28.1   (27.0-33.3)  pg


 


MCHC   33.4   (28.7-35.3)  g/dL


 


RDW   15.4 H   (12.4-15.0)  %


 


Plt Count   113 L   (117-477)  x10(3)uL


 


MPV   8.8   (6.7-11.0)  fL


 


Add Manual Diff   Yes   


 


Neutrophils % (Manual)   87 H   (46-82)  %


 


Lymphocytes % (Manual)   8 L   (13-37)  %


 


Monocytes % (Manual)   5   (4-12)  %


 


Anisocytosis   Few   


 


Microcytosis   Few   


 


Sodium    136  (135-145)  mmol/L


 


Potassium    3.8  (3.5-5.3)  mmol/L


 


Chloride    101  (100-110)  mmol/L


 


Carbon Dioxide    25  (21-32)  mmol/L


 


BUN    17  (7-18)  mg/dL


 


Creatinine    1.0  (0.70-1.30)  mg/dL


 


Est Cr Clr Drug Dosing    71.02  mL/min


 


Estimated GFR (MDRD)    > 60  (>60)  


 


BUN/Creatinine Ratio    17.0  (9-20)  


 


Glucose    267 H D  ()  mg/dL


 


POC Glucose  271 H    ()  mg/dL


 


Calcium    7.8 L  (8.6-10.2)  mg/dL


 


Total Bilirubin    1.1  (0.1-1.3)  mg/dL


 


AST    41 H D  (5-25)  IU/L


 


ALT    54 H D  (12-36)  U/L


 


Alkaline Phosphatase    116 H  ()  IU/L


 


Total Protein    6.2  (6.0-8.0)  g/dL


 


Albumin    2.5 L  (3.2-4.6)  g/dL


 


Globulin    3.7  g/dL


 


Albumin/Globulin Ratio    0.7  











Curt Results Last 24 Hours: 


                                  Microbiology











 01/14/21 21:25 Urine Culture - Final





 Urine, Voided    MIXED POSITIVE MODESTO DAY 2











Med Orders - Current: 


                               Current Medications





Acetaminophen (Tylenol)  650 mg PO Q4H PRN


   PRN Reason: Pain (Mild 1-3)/fever


   Last Admin: 01/15/21 02:27 Dose:  650 mg


   Documented by: 


Albuterol (Ventolin Hfa)  0 gm INH Q4H PRN


   PRN Reason: Wheezing


Atorvastatin Calcium (Lipitor)  40 mg PO BEDTIME formerly [facility]


   Last Admin: 01/17/21 20:26 Dose:  40 mg


   Documented by: 


Budesonide (Pulmicort)  0.5 mg NEB BIDRT formerly [facility]


   Last Admin: 01/18/21 06:45 Dose:  0.5 mg


   Documented by: 


Calcium Carbonate/Glycine (Tums)  500 mg PO Q2H PRN


   PRN Reason: Indigestion


   Last Admin: 01/16/21 10:36 Dose:  500 mg


   Documented by: 


Clopidogrel Bisulfate (Plavix)  75 mg PO DAILY formerly [facility]


   Last Admin: 01/18/21 08:22 Dose:  75 mg


   Documented by: 


Dextrose/Water (Dextrose 50% In Water)  50 ml IVPUSH ASDIRECTED PRN


   PRN Reason: Hypoglycemia


Donepezil HCl (Aricept)  5 mg PO BEDTIME formerly [facility]


   Last Admin: 01/17/21 20:26 Dose:  5 mg


   Documented by: 


Glucagon (Glucagen)  1 mg IM ASDIRECTED PRN


   PRN Reason: Hypoglycemia


Insulin Glargine (Lantus Solostar)  20 units SUBCUT BID formerly [facility]


   Last Admin: 01/17/21 09:21 Dose:  Not Given


   Documented by: 


Insulin Human Lispro (Humalog)  0 unit SUBCUT TIDMEALS formerly [facility]; Protocol


   Last Admin: 01/18/21 07:25 Dose:  6 units


   Documented by: 


Metoprolol Succinate (Toprol Xl)  25 mg PO DAILY formerly [facility]


   Last Admin: 01/18/21 08:22 Dose:  25 mg


   Documented by: 


Nitroglycerin (Nitrostat)  0.4 mg SL ASDIRECTED PRN


   PRN Reason: Chest Pain


Ondansetron HCl (Zofran Odt)  4 mg PO Q6H PRN


   PRN Reason: nausea, able to take PO


Pantoprazole Sodium (Protonix***)  40 mg PO BEDTIME formerly [facility]


   Last Admin: 01/17/21 20:25 Dose:  40 mg


   Documented by: 


Potassium Chloride (Klor-Con M20)  20 meq PO BID formerly [facility]


   Last Admin: 01/18/21 08:21 Dose:  20 meq


   Documented by: 


Sodium Chloride (Saline Flush)  10 ml FLUSH ASDIRECTED PRN


   PRN Reason: Keep Vein Open


   Last Admin: 01/17/21 18:11 Dose:  10 ml


   Documented by: 





Discontinued Medications





Dexamethasone (Dexamethasone)  6 mg IVPUSH DAILY formerly [facility]


   Stop: 01/23/21 17:16


   Last Admin: 01/17/21 08:58 Dose:  6 mg


   Documented by: 


Glipizide (Glucotrol Xl)  10 mg PO BID formerly [facility]


   Last Admin: 01/13/21 20:15 Dose:  Not Given


   Documented by: 


Hydroxyzine HCl (Atarax)  25 mg PO BEDTIME PRN


   PRN Reason: Insomnia


   Last Admin: 01/15/21 21:37 Dose:  25 mg


   Documented by: 


Sodium Chloride (Normal Saline)  1,000 mls @ 75 mls/hr IV ASDIRECTED formerly [facility]


   Last Infusion: 01/17/21 13:05 Dose:  50 mls/hr


   Documented by: 


Remdesivir 200 mg/ Sodium (Chloride)  250 mls @ 200 mls/hr IV ONETIME ONE


   Stop: 01/13/21 21:01


   Last Admin: 01/13/21 20:23 Dose:  200 mls/hr


   Documented by: 


Remdesivir 100 mg/ Sodium (Chloride)  100 mls @ 100 mls/hr IV Q24H formerly [facility]


   Stop: 01/17/21 18:59


   Last Admin: 01/17/21 17:11 Dose:  100 mls/hr


   Documented by: 


Insulin Glargine (Lantus Solostar)  20 units SUBCUT BID formerly [facility]


   Last Admin: 01/13/21 20:26 Dose:  20 unit


   Documented by: 


Sitagliptin Phosphate (Januvia)  100 mg PO DAILY formerly [facility]


   Last Admin: 01/14/21 09:03 Dose:  100 mg


   Documented by: 


Sodium Chloride (Saline Flush)  10 ml FLUSH ASDIRECTED PRN


   PRN Reason: Keep Vein Open











- Exam


General: Alert, Oriented (person), Cooperative, No Acute Distress


Lungs: Clear to Auscultation, Normal Respiratory Effort, Crackles (rare fine in 

bases)


Cardiovascular: Regular Rate, Regular Rhythm


GI/Abdominal Exam: Normal Bowel Sounds, Soft, Non-Tender, No Distention


Extremities: No Pedal Edema, Normal Capillary Refill


Peripheral Pulses: 2+: Radial (L), Radial (R)





Sepsis Event Note





- Evaluation


Sepsis Screening Result: No Definite Risk





- Focused Exam


Vital Signs: 


                                   Vital Signs











  Temp Temp Pulse Pulse Pulse Resp BP


 


 01/18/21 08:22    62     134/68


 


 01/18/21 08:15   97.8 F   62   22 H 


 


 01/18/21 06:45      64  


 


 01/18/21 05:30  98.8 F     67  18 


 


 01/18/21 00:00  98.6 F     65  20 


 


 01/17/21 23:00      65  16 














  BP BP Pulse Ox Pulse Ox


 


 01/18/21 08:22    


 


 01/18/21 08:15  134/68   93 L 


 


 01/18/21 06:45     95


 


 01/18/21 05:30   126/49 L  95 


 


 01/18/21 00:00   118/53 L  97  97


 


 01/17/21 23:00    96 














- Problem List & Annotations


(1) COVID-19


SNOMED Code(s): 416887868


   Code(s): U07.1 - COVID-19   Status: Acute   Current Visit: Yes   





(2) Viral pneumonia


SNOMED Code(s): 52032045


   Code(s): J12.9 - VIRAL PNEUMONIA, UNSPECIFIED   Status: Acute   Current 

Visit: Yes   





(3) Hypoxic


SNOMED Code(s): 154788176


   Code(s): R09.02 - HYPOXEMIA   Status: Acute   Current Visit: Yes   





(4) Diabetes


SNOMED Code(s): 43501438


   Code(s): E11.9 - TYPE 2 DIABETES MELLITUS WITHOUT COMPLICATIONS   Status: 

Chronic   Current Visit: Yes   


Qualifiers: 


   Diabetes mellitus long term insulin use: with long term use 





(5) GERD (gastroesophageal reflux disease)


SNOMED Code(s): 510097563


   Code(s): K21.9 - GASTRO-ESOPHAGEAL REFLUX DISEASE WITHOUT ESOPHAGITIS   

Status: Chronic   Priority: Medium   Current Visit: No   


Qualifiers: 


   Esophagitis presence: with esophagitis 





(6) Neurocognitive deficits


SNOMED Code(s): 132091171


   Code(s): R29.818 - OTHER SYMPTOMS AND SIGNS INVOLVING THE NERVOUS SYSTEM; 

R41.89 - OTH SYMPTOMS AND SIGNS W COGNITIVE FUNCTIONS AND AWARENESS   Status: 

Chronic   Current Visit: Yes   





(7) Palliative care patient


SNOMED Code(s): 396087856, 836692694


   Code(s): Z51.5 - ENCOUNTER FOR PALLIATIVE CARE   Status: Chronic   Current 

Visit: Yes   





(8) CHF (congestive heart failure)


SNOMED Code(s): 28885371


   Code(s): I50.9 - HEART FAILURE, UNSPECIFIED   Status: Chronic   Current 

Visit: Yes   


Qualifiers: 


   Heart failure type: unspecified 


Annotation/Comment:: Echo in 04/2018, EF of 65%   





(9) Delirium with dementia


SNOMED Code(s): 106003341


   Code(s): R41.0 - DISORIENTATION, UNSPECIFIED   Status: Acute   Current Visit:

Yes   Annotation/Comment:: Possibly secondary to Dexamethasone, will 

discontinue.    





(10) Generalized weakness


SNOMED Code(s): 48717178


   Code(s): R53.1 - WEAKNESS   Status: Acute   Current Visit: Yes   





(11) Recurrent falls


SNOMED Code(s): 826207039


   Code(s): R29.6 - REPEATED FALLS   Status: Acute   Current Visit: Yes   





- Problem List Review


Problem List Initiated/Reviewed/Updated: Yes





- My Orders


Last 24 Hours: 


My Active Orders





01/18/21 06:00


OT Evaluation and Treatment [CONS] Routine 


PT Evaluation and Treatment [CONS] Routine 





01/19/21 06:00


CBC WITH AUTO DIFF [HEME] DAILY 


COMPREHENSIVE METABOLIC PN,CMP [CHEM] DAILY 














- Plan


Plan:: 





1. COVID viral pneumonia: Completed treatment of Remdesivir. Oxygen to keep sats

 >94% by nasal cannula. Currently 1-2L.  


2. DM: Accuchecks qidac&hs, Consistent carb diet, Lantus substitute for Levemir 

20 units bid, Humalog medium dose sliding scale. Continue to hold oral diabetic 

meds at this time.  


3. DVT: TEDs BLE, Plavix 75 mg daily, platelets 113l, D-Dimer ordered since he 

is not able to be weaned though completed treatment.  


4. Dyspepsia: Tums 500 mg q2h as needed, keep head of bed at 30 degrees.


5. Delirium with dementia: Most likely secondary to steroids, discontinued. 

Symptoms are mild at this point will NOT do Haldol at this time. Will have 

someone sit with him, have asked the wife to come in.

## 2021-01-18 NOTE — CR
INDICATION:  Followup COVID pneumonia. 



CHEST, ONE VIEW:  Portable AP upright view of the chest 01/16/21 was compared 
with 01/13/21 and 01/28/11.  



There are again noted bilateral infiltrates compatible with COVID-19 pneumonia. 
These appear to be slightly more prominent, possibly emphasized by the AP 
positioning and poor inspiration but there may be a mild degree of progression 
especially in the right mid lung field.  



The heart appears prominent emphasized also by the poor inspiration and AP 
positioning.  



When clinically possible, full inspiration PA and lateral views of the chest may
be helpful for further evaluation. 



MTDD

## 2021-01-19 NOTE — CR
INDICATION:  Low O2, COVID.  



CHEST ONE VIEW:  AP upright view of the chest 01/19/21 was compared with 01/16 
and 01/13/21.  



Infiltration appears to be more prominent scattered about the lungs compatible 
with increase in severity of COVID-19 pneumonia.  



No other change or new acute process was seen.  



The heart remains prominent with tortuous aorta, calcified in the arch, and 
bipolar pacemaker leads are unchanged in position.  



Overlying EKG leads are noted.  



IMPRESSION:  Increasing infiltration appears to be present bilaterally 
compatible with progressively more severe pneumonia.  

MTDD

## 2021-01-19 NOTE — CT
INDICATION:  Short of breath, D-dimer greater than 3.5, COVID positive, question
PE.  



COMPUTERIZED TOMOGRAPHY ANGIOGRAPHY OF THE CHEST WITH CONTRAST:  Spiral 1.25 mm 
axial sections were obtained through the chest with 100 cc of Isovue-370 at 3 cc
per second with sagittal, coronal and axial reconstructions 01/19/21 - no 
comparisons.  

Total exam DLP was 911.48 mGy-cm.  



At the lower pole of the right lobe of the thyroid there was a 7 mm low-density 
lesion, etiology indeterminate, ultrasound may be helpful for further 
evaluation.  

Brachiocephalic artery calcifications are noted along with aortic arch and 
descending and ascending aortic calcifications as well as coronary artery 
calcifications.  

The heart is enlarged.  

Mild thickening of the pericardium is noted.  

There is suggestion of a small fixed hiatal hernia.  Calcifications are also 
noted in the abdominal aorta and splenic, as well as superior mesenteric 
arteries and proximal renal arteries.  

A 31 mm low-density lesion in the posterior upper middle pole medial cortex of 
the right kidney is noted, mostly exophytic.  

Gallbladder is contracted.  



Rather extensive consolidating pneumonia is noted in the lower lobes and upper 
lobes with the highest degree of consolidation at the lower lobes and especially
a greater amount of infiltrate appears to be present in the left lower lobe.  In
the upper lobes, the infiltration is less consolidated and again is more 
prominent on the left than right.  The infiltrations may be on the basis of 
COVID-19 pneumonia.  



IMPRESSION: 

1.  No gross evidence of pulmonary emboli identified - detail of the pulmonary 
arteries is less than  ideal due to patient inability to hold his breath.  

2.  Extensive bilateral infiltration which could be on the basis of COVID-19 
pneumonia.  It appears to be more severe peripherally and more severe in the 
lower lobes.  

3.  ASHD with cardiomegaly, bipolar pacemaker leads and slight thickening of the
pericardium.  

4.  3 cm low-density lesion right kidney, likely a simple cyst.  



Report was called to Dr. Jon at 1121 hours, 01/19/21.  

[facility]

## 2021-01-19 NOTE — PCM.PN
- General Info


Date of Service: 01/19/21


Subjective Update: 





Ramone worsened overnight, was placed on BiPAP at 70% 10/5, saturating now at 95-

97%. His wife was notified this morning, she stated he did have an advance 

directive that states she does not want CPR or intubation. This was sent over 

from the clinic and code status was changed to reflect his wishes. Was agitated 

overnight due to BiPAP received some Ativan and Zyprexa last night. Has taken 

some sips of water with his medications okay. CT angiogram this morning was able

to be completed, did not have any reactions to contrast. It did not show 

pulmonary embolism though he was not able to hold his breath. He was on high 

flow nasal cannula for procedure. Worsening pneumonia related to Covid. 





- Patient Data


Vitals - Most Recent: 


                                Last Vital Signs











Temp  97.9 F   01/19/21 11:25


 


Pulse  67   01/19/21 11:25


 


Resp  24 H  01/19/21 11:25


 


BP  136/66   01/19/21 11:25


 


Pulse Ox  92 L  01/19/21 12:20











Weight - Most Recent: 241 lb 14.4 oz


I&O - Last 24 Hours: 


                                 Intake & Output











 01/18/21 01/19/21 01/19/21





 22:59 06:59 14:59


 


Intake Total 332 403 121


 


Output Total 0 0 


 


Balance 332 403 121











Lab Results Last 24 Hours: 


                         Laboratory Results - last 24 hr











  01/18/21 01/18/21 01/18/21 Range/Units





  16:55 18:15 20:07 


 


WBC     (3.2-10.1)  x10-3/uL


 


RBC     (3.90-5.90)  x10(6)uL


 


Hgb     (12.9-17.7)  g/dL


 


Hct     (38.3-50.1)  %


 


MCV     (80.8-98.7)  fL


 


MCH     (27.0-33.3)  pg


 


MCHC     (28.7-35.3)  g/dL


 


RDW     (12.4-15.0)  %


 


Plt Count     (117-477)  x10(3)uL


 


MPV     (6.7-11.0)  fL


 


Neut % (Auto)     (40.3-71.8)  %


 


Lymph % (Auto)     (15.8-45.3)  %


 


Mono % (Auto)     (5.5-15.2)  %


 


Eos % (Auto)     (0.1-6.8)  %


 


Baso % (Auto)     (0.3-3.8)  %


 


Neut # (Auto)     (1.7-6.9)  x10-3/uL


 


Lymph # (Auto)     (0.5-4.5)  x10-3/uL


 


Mono # (Auto)     (0.0-1.2)  x10-3/uL


 


Eos # (Auto)     (0.0-0.6)  x10-3/uL


 


Baso # (Auto)     (0.0-0.3)  x10-3/uL


 


APTT   53.6 H   (24.4-33.2)  SECONDS


 


POC VBG pH     (7.32-7.43)  pH Units


 


POC VBG pCO2     (41-51)  mmHg


 


POC VBG HCO3     (21-29)  mmol/L


 


VBG Base Excess     (-2-3)  mmol/L


 


O2 Delivery Device     


 


Sodium     (135-145)  mmol/L


 


Potassium     (3.5-5.3)  mmol/L


 


Chloride     (100-110)  mmol/L


 


Carbon Dioxide     (21-32)  mmol/L


 


BUN     (7-18)  mg/dL


 


Creatinine     (0.70-1.30)  mg/dL


 


Est Cr Clr Drug Dosing     mL/min


 


Estimated GFR (MDRD)     (>60)  


 


BUN/Creatinine Ratio     (9-20)  


 


Glucose     ()  mg/dL


 


POC Glucose  281 H   343 H  ()  mg/dL


 


Calcium     (8.6-10.2)  mg/dL


 


Total Bilirubin     (0.1-1.3)  mg/dL


 


AST     (5-25)  IU/L


 


ALT     (12-36)  U/L


 


Alkaline Phosphatase     ()  IU/L


 


Total Protein     (6.0-8.0)  g/dL


 


Albumin     (3.2-4.6)  g/dL


 


Globulin     g/dL


 


Albumin/Globulin Ratio     














  01/18/21 01/18/21 01/19/21 Range/Units





  23:10 23:10 05:05 


 


WBC    9.6  (3.2-10.1)  x10-3/uL


 


RBC    4.50  (3.90-5.90)  x10(6)uL


 


Hgb    12.6 L  (12.9-17.7)  g/dL


 


Hct    38.6  (38.3-50.1)  %


 


MCV    85.7  (80.8-98.7)  fL


 


MCH    28.0  (27.0-33.3)  pg


 


MCHC    32.7  (28.7-35.3)  g/dL


 


RDW    15.9 H  (12.4-15.0)  %


 


Plt Count    119  (117-477)  x10(3)uL


 


MPV    9.3  (6.7-11.0)  fL


 


Neut % (Auto)    92.1 H  (40.3-71.8)  %


 


Lymph % (Auto)    3.9 L  (15.8-45.3)  %


 


Mono % (Auto)    3.9 L  (5.5-15.2)  %


 


Eos % (Auto)    0.0 L  (0.1-6.8)  %


 


Baso % (Auto)    0.1 L  (0.3-3.8)  %


 


Neut # (Auto)    8.8 H  (1.7-6.9)  x10-3/uL


 


Lymph # (Auto)    0.4 L  (0.5-4.5)  x10-3/uL


 


Mono # (Auto)    0.4  (0.0-1.2)  x10-3/uL


 


Eos # (Auto)    0.0  (0.0-0.6)  x10-3/uL


 


Baso # (Auto)    0.0  (0.0-0.3)  x10-3/uL


 


APTT  64.0 H*    (24.4-33.2)  SECONDS


 


POC VBG pH   7.40   (7.32-7.43)  pH Units


 


POC VBG pCO2   38 L   (41-51)  mmHg


 


POC VBG HCO3   24   (21-29)  mmol/L


 


VBG Base Excess   -1   (-2-3)  mmol/L


 


O2 Delivery Device   Non rebr mask   


 


Sodium     (135-145)  mmol/L


 


Potassium     (3.5-5.3)  mmol/L


 


Chloride     (100-110)  mmol/L


 


Carbon Dioxide     (21-32)  mmol/L


 


BUN     (7-18)  mg/dL


 


Creatinine     (0.70-1.30)  mg/dL


 


Est Cr Clr Drug Dosing     mL/min


 


Estimated GFR (MDRD)     (>60)  


 


BUN/Creatinine Ratio     (9-20)  


 


Glucose     ()  mg/dL


 


POC Glucose     ()  mg/dL


 


Calcium     (8.6-10.2)  mg/dL


 


Total Bilirubin     (0.1-1.3)  mg/dL


 


AST     (5-25)  IU/L


 


ALT     (12-36)  U/L


 


Alkaline Phosphatase     ()  IU/L


 


Total Protein     (6.0-8.0)  g/dL


 


Albumin     (3.2-4.6)  g/dL


 


Globulin     g/dL


 


Albumin/Globulin Ratio     














  01/19/21 01/19/21 01/19/21 Range/Units





  05:05 05:05 06:40 


 


WBC     (3.2-10.1)  x10-3/uL


 


RBC     (3.90-5.90)  x10(6)uL


 


Hgb     (12.9-17.7)  g/dL


 


Hct     (38.3-50.1)  %


 


MCV     (80.8-98.7)  fL


 


MCH     (27.0-33.3)  pg


 


MCHC     (28.7-35.3)  g/dL


 


RDW     (12.4-15.0)  %


 


Plt Count     (117-477)  x10(3)uL


 


MPV     (6.7-11.0)  fL


 


Neut % (Auto)     (40.3-71.8)  %


 


Lymph % (Auto)     (15.8-45.3)  %


 


Mono % (Auto)     (5.5-15.2)  %


 


Eos % (Auto)     (0.1-6.8)  %


 


Baso % (Auto)     (0.3-3.8)  %


 


Neut # (Auto)     (1.7-6.9)  x10-3/uL


 


Lymph # (Auto)     (0.5-4.5)  x10-3/uL


 


Mono # (Auto)     (0.0-1.2)  x10-3/uL


 


Eos # (Auto)     (0.0-0.6)  x10-3/uL


 


Baso # (Auto)     (0.0-0.3)  x10-3/uL


 


APTT   73.4 H*   (24.4-33.2)  SECONDS


 


POC VBG pH     (7.32-7.43)  pH Units


 


POC VBG pCO2     (41-51)  mmHg


 


POC VBG HCO3     (21-29)  mmol/L


 


VBG Base Excess     (-2-3)  mmol/L


 


O2 Delivery Device     


 


Sodium  135    (135-145)  mmol/L


 


Potassium  4.3    (3.5-5.3)  mmol/L


 


Chloride  101    (100-110)  mmol/L


 


Carbon Dioxide  25    (21-32)  mmol/L


 


BUN  19 H    (7-18)  mg/dL


 


Creatinine  1.0    (0.70-1.30)  mg/dL


 


Est Cr Clr Drug Dosing  71.02    mL/min


 


Estimated GFR (MDRD)  > 60    (>60)  


 


BUN/Creatinine Ratio  19.0    (9-20)  


 


Glucose  275 H    ()  mg/dL


 


POC Glucose    296 H  ()  mg/dL


 


Calcium  7.5 L    (8.6-10.2)  mg/dL


 


Total Bilirubin  0.9    (0.1-1.3)  mg/dL


 


AST  26 H D    (5-25)  IU/L


 


ALT  42 H D    (12-36)  U/L


 


Alkaline Phosphatase  127 H    ()  IU/L


 


Total Protein  6.0    (6.0-8.0)  g/dL


 


Albumin  2.1 L    (3.2-4.6)  g/dL


 


Globulin  3.9    g/dL


 


Albumin/Globulin Ratio  0.5    














  01/19/21 Range/Units





  11:15 


 


WBC   (3.2-10.1)  x10-3/uL


 


RBC   (3.90-5.90)  x10(6)uL


 


Hgb   (12.9-17.7)  g/dL


 


Hct   (38.3-50.1)  %


 


MCV   (80.8-98.7)  fL


 


MCH   (27.0-33.3)  pg


 


MCHC   (28.7-35.3)  g/dL


 


RDW   (12.4-15.0)  %


 


Plt Count   (117-477)  x10(3)uL


 


MPV   (6.7-11.0)  fL


 


Neut % (Auto)   (40.3-71.8)  %


 


Lymph % (Auto)   (15.8-45.3)  %


 


Mono % (Auto)   (5.5-15.2)  %


 


Eos % (Auto)   (0.1-6.8)  %


 


Baso % (Auto)   (0.3-3.8)  %


 


Neut # (Auto)   (1.7-6.9)  x10-3/uL


 


Lymph # (Auto)   (0.5-4.5)  x10-3/uL


 


Mono # (Auto)   (0.0-1.2)  x10-3/uL


 


Eos # (Auto)   (0.0-0.6)  x10-3/uL


 


Baso # (Auto)   (0.0-0.3)  x10-3/uL


 


APTT   (24.4-33.2)  SECONDS


 


POC VBG pH   (7.32-7.43)  pH Units


 


POC VBG pCO2   (41-51)  mmHg


 


POC VBG HCO3   (21-29)  mmol/L


 


VBG Base Excess   (-2-3)  mmol/L


 


O2 Delivery Device   


 


Sodium   (135-145)  mmol/L


 


Potassium   (3.5-5.3)  mmol/L


 


Chloride   (100-110)  mmol/L


 


Carbon Dioxide   (21-32)  mmol/L


 


BUN   (7-18)  mg/dL


 


Creatinine   (0.70-1.30)  mg/dL


 


Est Cr Clr Drug Dosing   mL/min


 


Estimated GFR (MDRD)   (>60)  


 


BUN/Creatinine Ratio   (9-20)  


 


Glucose   ()  mg/dL


 


POC Glucose  299 H  ()  mg/dL


 


Calcium   (8.6-10.2)  mg/dL


 


Total Bilirubin   (0.1-1.3)  mg/dL


 


AST   (5-25)  IU/L


 


ALT   (12-36)  U/L


 


Alkaline Phosphatase   ()  IU/L


 


Total Protein   (6.0-8.0)  g/dL


 


Albumin   (3.2-4.6)  g/dL


 


Globulin   g/dL


 


Albumin/Globulin Ratio   











Med Orders - Current: 


                               Current Medications





Acetaminophen (Tylenol)  650 mg PO Q4H PRN


   PRN Reason: Pain (Mild 1-3)/fever


   Last Admin: 01/15/21 02:27 Dose:  650 mg


   Documented by: 


Albuterol (Proventil Neb Soln)  2.5 mg NEB Q4H PRN


   PRN Reason: Shortness of Breath


   Last Admin: 01/19/21 13:25 Dose:  2.5 mg


   Documented by: 


Budesonide (Pulmicort)  0.5 mg NEB BIDRT [facility]


   Last Admin: 01/19/21 06:31 Dose:  0.5 mg


   Documented by: 


Calcium Carbonate/Glycine (Tums)  500 mg PO Q2H PRN


   PRN Reason: Indigestion


   Last Admin: 01/16/21 10:36 Dose:  500 mg


   Documented by: 


Dextrose/Water (Dextrose 50% In Water)  50 ml IVPUSH ASDIRECTED PRN


   PRN Reason: Hypoglycemia


Enoxaparin Sodium (Lovenox)  40 mg SUBCUT DAILY@1600 [facility]


Glucagon (Glucagen)  1 mg IM ASDIRECTED PRN


   PRN Reason: Hypoglycemia


Insulin Glargine (Lantus Solostar)  10 units SUBCUT BID [facility]


   Last Admin: 01/19/21 09:15 Dose:  10 units


   Documented by: 


Insulin Human Lispro (Humalog)  0 unit SUBCUT TIDMEALS [facility]; Protocol


   Last Admin: 01/19/21 12:23 Dose:  9 units


   Documented by: 


Lorazepam (Ativan)  1 mg IVPUSH Q2H PRN


   PRN Reason: Anxiety


Metoprolol Succinate (Toprol Xl)  25 mg PO DAILY [facility]


   Last Admin: 01/19/21 08:53 Dose:  25 mg


   Documented by: 


Morphine Sulfate (Morphine)  2 mg IVPUSH Q2H PRN


   PRN Reason: air hunger


Nitroglycerin (Nitrostat)  0.4 mg SL ASDIRECTED PRN


   PRN Reason: Chest Pain


Ondansetron HCl (Zofran Odt)  4 mg PO Q6H PRN


   PRN Reason: nausea, able to take PO


Pantoprazole Sodium (Protonix***)  40 mg PO BEDTIME [facility]


   Last Admin: 01/18/21 20:22 Dose:  40 mg


   Documented by: 


Sodium Chloride (Saline Flush)  10 ml FLUSH ASDIRECTED PRN


   PRN Reason: Keep Vein Open


   Last Admin: 01/18/21 13:10 Dose:  10 ml


   Documented by: 





Discontinued Medications





Albuterol (Ventolin Hfa)  0 gm INH Q4H PRN


   PRN Reason: Wheezing


Atorvastatin Calcium (Lipitor)  40 mg PO BEDTIME [facility]


   Last Admin: 01/18/21 20:17 Dose:  40 mg


   Documented by: 


Clopidogrel Bisulfate (Plavix)  75 mg PO DAILY [facility]


   Last Admin: 01/18/21 08:22 Dose:  75 mg


   Documented by: 


Dexamethasone (Dexamethasone)  6 mg IVPUSH DAILY [facility]


   Stop: 01/23/21 17:16


   Last Admin: 01/17/21 08:58 Dose:  6 mg


   Documented by: 


Diphenhydramine HCl (Benadryl)  50 mg PO ONETIME ONE


   Stop: 01/19/21 09:01


   Last Admin: 01/19/21 08:51 Dose:  50 mg


   Documented by: 


Donepezil HCl (Aricept)  5 mg PO BEDTIME [facility]


   Last Admin: 01/18/21 20:16 Dose:  5 mg


   Documented by: 


Glipizide (Glucotrol Xl)  10 mg PO BID [facility]


   Last Admin: 01/13/21 20:15 Dose:  Not Given


   Documented by: 


Heparin Sodium (Porcine) (Heparin Sodium)  5,000 units IVPUSH ONETIME ONE


   Stop: 01/18/21 10:46


   Last Admin: 01/18/21 11:01 Dose:  5,000 units


   Documented by: 


Hydroxyzine HCl (Atarax)  25 mg PO BEDTIME PRN


   PRN Reason: Insomnia


   Last Admin: 01/15/21 21:37 Dose:  25 mg


   Documented by: 


Sodium Chloride (Normal Saline)  1,000 mls @ 75 mls/hr IV ASDIRECTED [facility]


   Last Infusion: 01/17/21 13:05 Dose:  50 mls/hr


   Documented by: 


Remdesivir 200 mg/ Sodium (Chloride)  250 mls @ 200 mls/hr IV ONETIME ONE


   Stop: 01/13/21 21:01


   Last Admin: 01/13/21 20:23 Dose:  200 mls/hr


   Documented by: 


Remdesivir 100 mg/ Sodium (Chloride)  100 mls @ 100 mls/hr IV Q24H [facility]


   Stop: 01/17/21 18:59


   Last Admin: 01/17/21 17:11 Dose:  100 mls/hr


   Documented by: 


Heparin Sodium/Sodium Chloride (Heparin 25,000 Units In 1/2 Ns 500 Ml)  25,000 

units in 500 mls @ 25.983 mls/hr IV TITRATE [facility]; Protocol


   Last Admin: 01/19/21 09:01 Dose:  9.84 units/kg/hr, 21.594 mls/hr


   Documented by: 


Insulin Glargine (Lantus Solostar)  20 units SUBCUT BID [facility]


   Last Admin: 01/13/21 20:26 Dose:  20 unit


   Documented by: 


Insulin Glargine (Lantus Solostar)  20 units SUBCUT BID [facility]


   Last Admin: 01/19/21 09:54 Dose:  Not Given


   Documented by: 


Iopamidol (Isovue-370 (76%))  100 ml IV .AS DIRECTED ONE


   Stop: 01/19/21 09:43


   Last Admin: 01/19/21 10:32 Dose:  100 ml


   Documented by: 


Lorazepam (Ativan)  1 mg IVPUSH ONETIME ONE


   Stop: 01/19/21 00:09


   Last Admin: 01/19/21 00:16 Dose:  1 mg


   Documented by: 


Lorazepam (Ativan) Confirm Administered Dose 2 mg .ROUTE .STK-MED ONE


   Stop: 01/19/21 00:11


   Last Admin: 01/19/21 00:17 Dose:  Not Given


   Documented by: 


Morphine Sulfate (Morphine) Confirm Administered Dose 2 mg .ROUTE .STK-MED ONE


   Stop: 01/18/21 23:26


   Last Admin: 01/18/21 23:49 Dose:  Not Given


   Documented by: 


Morphine Sulfate (Morphine)  2 mg IVPUSH ONETIME ONE


   Stop: 01/18/21 23:29


   Last Admin: 01/18/21 23:28 Dose:  2 mg


   Documented by: 


Olanzapine (Zyprexa)  5 mg IM ONETIME ONE


   Stop: 01/19/21 01:13


   Last Admin: 01/19/21 01:22 Dose:  5 mg


   Documented by: 


Potassium Chloride (Klor-Con M20)  20 meq PO BID [facility]


   Last Admin: 01/19/21 08:51 Dose:  20 meq


   Documented by: 


Prednisone (Prednisone)  50 mg PO TID@0300,0900,2100 [facility]


   Stop: 01/19/21 09:01


   Last Admin: 01/19/21 08:52 Dose:  50 mg


   Documented by: 


Sitagliptin Phosphate (Januvia)  100 mg PO DAILY [facility]


   Last Admin: 01/14/21 09:03 Dose:  100 mg


   Documented by: 


Sodium Chloride (Saline Flush)  10 ml FLUSH ASDIRECTED PRN


   PRN Reason: Keep Vein Open











- Exam


Quality Assessment: Supplemental Oxygen (on BiPAP)


General: Obtunded


Lungs: Normal Respiratory Effort, Decreased Breath Sounds, Crackles (BLL, RML). 

No: Wheezing


Cardiovascular: Regular Rate, Regular Rhythm


GI/Abdominal Exam: Normal Bowel Sounds, Soft, No Distention





Sepsis Event Note





- Evaluation


Sepsis Screening Result: No Definite Risk





- Focused Exam


Vital Signs: 


                                   Vital Signs











  Temp Pulse Pulse Resp BP BP Pulse Ox


 


 01/19/21 12:20       


 


 01/19/21 11:25  97.9 F   67  24 H   136/66  97


 


 01/19/21 11:20       


 


 01/19/21 10:35       


 


 01/19/21 10:15       


 


 01/19/21 09:48       


 


 01/19/21 08:53   68    126/58 L  


 


 01/19/21 08:00  97.6 F   68  27 H   141/54 H  95


 


 01/19/21 06:00    60  20   137/68  95


 


 01/19/21 05:00    64  16   127/69  95


 


 01/19/21 04:00    60    121/57 L  93 L


 


 01/19/21 03:00    64  30 H   120/55 L  95


 


 01/19/21 02:30    67  28 H   122/49 L  95














  Pulse Ox Pulse Ox


 


 01/19/21 12:20  92 L 


 


 01/19/21 11:25  


 


 01/19/21 11:20  97 


 


 01/19/21 10:35  88 L 


 


 01/19/21 10:15   95


 


 01/19/21 09:48  97 


 


 01/19/21 08:53  


 


 01/19/21 08:00  95 


 


 01/19/21 06:00  


 


 01/19/21 05:00  


 


 01/19/21 04:00  


 


 01/19/21 03:00  


 


 01/19/21 02:30  














- Problem List & Annotations


(1) COVID-19


SNOMED Code(s): 463588044


   Code(s): U07.1 - COVID-19   Status: Acute   Current Visit: Yes   





(2) Viral pneumonia


SNOMED Code(s): 27152742


   Code(s): J12.9 - VIRAL PNEUMONIA, UNSPECIFIED   Status: Acute   Current 

Visit: Yes   





(3) Hypoxic


SNOMED Code(s): 901749990


   Code(s): R09.02 - HYPOXEMIA   Status: Acute   Current Visit: Yes   

Annotation/Comment:: on BiPAP   





(4) Diabetes


SNOMED Code(s): 48354346


   Code(s): E11.9 - TYPE 2 DIABETES MELLITUS WITHOUT COMPLICATIONS   Status: 

Chronic   Current Visit: Yes   


Qualifiers: 


   Diabetes mellitus long term insulin use: with long term use 





(5) GERD (gastroesophageal reflux disease)


SNOMED Code(s): 843832881


   Code(s): K21.9 - GASTRO-ESOPHAGEAL REFLUX DISEASE WITHOUT ESOPHAGITIS   

Status: Chronic   Priority: Medium   Current Visit: No   


Qualifiers: 


   Esophagitis presence: with esophagitis 





(6) Neurocognitive deficits


SNOMED Code(s): 371669357


   Code(s): R29.818 - OTHER SYMPTOMS AND SIGNS INVOLVING THE NERVOUS SYSTEM; 

R41.89 - OTH SYMPTOMS AND SIGNS W COGNITIVE FUNCTIONS AND AWARENESS   Status: 

Chronic   Current Visit: Yes   





(7) Palliative care patient


SNOMED Code(s): 213839131, 688928534


   Code(s): Z51.5 - ENCOUNTER FOR PALLIATIVE CARE   Status: Chronic   Current 

Visit: Yes   





(8) CHF (congestive heart failure)


SNOMED Code(s): 32002197


   Code(s): I50.9 - HEART FAILURE, UNSPECIFIED   Status: Chronic   Current 

Visit: Yes   


Qualifiers: 


   Heart failure type: unspecified 


Annotation/Comment:: Echo in 04/2018, EF of 65%   





(9) Delirium with dementia


SNOMED Code(s): 999276774


   Code(s): R41.0 - DISORIENTATION, UNSPECIFIED   Status: Acute   Current Visit:

Yes   





(10) Generalized weakness


SNOMED Code(s): 89348166


   Code(s): R53.1 - WEAKNESS   Status: Acute   Current Visit: Yes   





(11) Recurrent falls


SNOMED Code(s): 903874946


   Code(s): R29.6 - REPEATED FALLS   Status: Acute   Current Visit: Yes   





- Problem List Review


Problem List Initiated/Reviewed/Updated: Yes





- My Orders


Last 24 Hours: 


My Active Orders





01/19/21 08:06


Albuterol [Proventil Neb Soln]   2.5 mg NEB Q4H PRN 





01/19/21 08:07


RT Aerosol Therapy [RC] ASDIRECTED 





01/19/21 08:09


CXR [Chest 1V Frontal] [CR] Routine 





01/19/21 08:33


Patient Status [ADT] Routine 





01/19/21 09:15


Insulin Glarg,Human.Rec.Analog [LantUS Solostar]   10 units SUBCUT BID 





01/19/21 10:29


Code Status [Resuscitation Status] Routine 





01/19/21 10:46


LORazepam [Ativan]   1 mg IVPUSH Q2H PRN 


Morphine   2 mg IVPUSH Q2H PRN 





01/19/21 16:00


Enoxaparin [Lovenox]   40 mg SUBCUT DAILY@1600 





01/21/21 11:00


CBC W/O DIFF,HEMOGRAM [HEME] Q48H 





01/23/21 11:00


CBC W/O DIFF,HEMOGRAM [HEME] Q48H 





01/25/21 11:00


CBC W/O DIFF,HEMOGRAM [HEME] Q48H 





01/27/21 11:00


CBC W/O DIFF,HEMOGRAM [HEME] Q48H 





01/29/21 11:00


CBC W/O DIFF,HEMOGRAM [HEME] Q48H 














- Plan


Plan:: 





1. COVID viral pneumonia: worsened overnight on BiPAP, advance directive 

received from clinic shows DNR/DNI, confirmed by family. Will continue present 

cares, has worsening pneumonia, labs consistent with viral, WBC 9.6. 


2. DM: Accuchecks qidac&hs, Consistent carb diet, Lantus 10 units bid, Humalog 

high dose sliding scale. 


3. DVT: TEDs BLE, no PE, discontinued Heparin drip, will start Lovenox 40 mg sq 

daily at 1600. 


4. Dyspepsia: Tums 500 mg q2h as needed, keep head of bed at 30 degrees.


5. Spiritual services: praying with family via phone/Zoom this afternoon. 

Advised them at this point we are doing all necessary treatments, whether he 

improves or worsens difficult to predict, prognosis is poor. Family did state if

 he passed that he would go to [facility] in [place].

## 2021-01-20 NOTE — PCM.PN
- General Info


Date of Service: 01/20/21


Subjective Update: 





Ramone dropped his saturation last night to 88-90% at 75-80% FiO2, he was 94% when 

I examined him this morning. He drank a little bit with meds yesterday. Tried 

high flow oxygen so he could eat and he desaturated down to 70s. Ativan made him

more agitated yesterday so discontinued and started on Seroquel which he slept 

well with. His pacemaker has been firing almost at 100%, his Metoprolol was held

this morning due to SBP at 110. Blood sugars are still high even with decreased 

intake, decreased his Lantus to 10 units bid. 





- Patient Data


Vitals - Most Recent: 


                                Last Vital Signs











Temp  97.4 F   01/20/21 08:00


 


Pulse  68   01/20/21 13:30


 


Resp  29 H  01/20/21 13:30


 


BP  112/49 L  01/20/21 13:30


 


Pulse Ox  95   01/20/21 13:30











Weight - Most Recent: 241 lb 14.4 oz


I&O - Last 24 Hours: 


                                 Intake & Output











 01/19/21 01/20/21 01/20/21





 22:59 06:59 14:59


 


Intake Total 250 100 


 


Balance 250 100 











Lab Results Last 24 Hours: 


                         Laboratory Results - last 24 hr











  01/19/21 01/19/21 01/20/21 Range/Units





  16:24 22:00 06:14 


 


POC Glucose  264 H  347 H  216 H  ()  mg/dL














  01/20/21 Range/Units





  11:13 


 


POC Glucose  213 H  ()  mg/dL











Med Orders - Current: 


                               Current Medications





Acetaminophen (Tylenol)  650 mg PO Q4H PRN


   PRN Reason: Pain (Mild 1-3)/fever


   Last Admin: 01/15/21 02:27 Dose:  650 mg


   Documented by: 


Albuterol (Proventil Neb Soln)  2.5 mg NEB Q4H PRN


   PRN Reason: Shortness of Breath


   Last Admin: 01/20/21 02:34 Dose:  2.5 mg


   Documented by: 


Budesonide (Pulmicort)  0.5 mg NEB BIDRT MIKAEL


   Last Admin: 01/20/21 06:07 Dose:  0.5 mg


   Documented by: 


Calcium Carbonate/Glycine (Tums)  500 mg PO Q2H PRN


   PRN Reason: Indigestion


   Last Admin: 01/16/21 10:36 Dose:  500 mg


   Documented by: 


Dexamethasone (Dexamethasone)  6 mg IVPUSH DAILY [facility]


   Stop: 01/23/21 09:01


   Last Admin: 01/20/21 07:59 Dose:  Not Given


   Documented by: 


Dextrose/Water (Dextrose 50% In Water)  50 ml IVPUSH ASDIRECTED PRN


   PRN Reason: Hypoglycemia


Enoxaparin Sodium (Lovenox)  40 mg SUBCUT DAILY@1800 [facility]


   Last Admin: 01/19/21 17:12 Dose:  40 mg


   Documented by: 


Glucagon (Glucagen)  1 mg IM ASDIRECTED PRN


   PRN Reason: Hypoglycemia


Insulin Glargine (Lantus Solostar)  10 units SUBCUT BID [facility]


   Last Admin: 01/20/21 07:59 Dose:  Not Given


   Documented by: 


Insulin Human Lispro (Humalog)  0 unit SUBCUT TIDMEALS [facility]; Protocol


   Last Admin: 01/20/21 11:56 Dose:  6 units


   Documented by: 


Metoprolol Succinate (Toprol Xl)  25 mg PO DAILY [facility]


   Last Admin: 01/20/21 08:21 Dose:  Not Given


   Documented by: 


Morphine Sulfate (Morphine)  2 mg IVPUSH Q1H PRN


   PRN Reason: air hunger


   Last Admin: 01/20/21 13:59 Dose:  2 mg


   Documented by: 


Nitroglycerin (Nitrostat)  0.4 mg SL ASDIRECTED PRN


   PRN Reason: Chest Pain


Pantoprazole Sodium (Protonix***)  40 mg PO BEDTIME [facility]


   Last Admin: 01/19/21 20:05 Dose:  40 mg


   Documented by: 


Quetiapine Fumarate (Seroquel)  25 mg PO BID PRN


   PRN Reason: DELIRIUM


   Last Admin: 01/19/21 20:02 Dose:  25 mg


   Documented by: 


Sodium Chloride (Saline Flush)  10 ml FLUSH ASDIRECTED PRN


   PRN Reason: Keep Vein Open


   Last Admin: 01/20/21 14:00 Dose:  10 ml


   Documented by: 





Discontinued Medications





Albuterol (Ventolin Hfa)  0 gm INH Q4H PRN


   PRN Reason: Wheezing


Atorvastatin Calcium (Lipitor)  40 mg PO BEDTIME [facility]


   Last Admin: 01/18/21 20:17 Dose:  40 mg


   Documented by: 


Clopidogrel Bisulfate (Plavix)  75 mg PO DAILY [facility]


   Last Admin: 01/18/21 08:22 Dose:  75 mg


   Documented by: 


Dexamethasone (Dexamethasone)  6 mg IVPUSH DAILY [facility]


   Stop: 01/23/21 17:16


   Last Admin: 01/17/21 08:58 Dose:  6 mg


   Documented by: 


Diphenhydramine HCl (Benadryl)  50 mg PO ONETIME ONE


   Stop: 01/19/21 09:01


   Last Admin: 01/19/21 08:51 Dose:  50 mg


   Documented by: 


Donepezil HCl (Aricept)  5 mg PO BEDTIME [facility]


   Last Admin: 01/18/21 20:16 Dose:  5 mg


   Documented by: 


Glipizide (Glucotrol Xl)  10 mg PO BID [facility]


   Last Admin: 01/13/21 20:15 Dose:  Not Given


   Documented by: 


Heparin Sodium (Porcine) (Heparin Sodium)  5,000 units IVPUSH ONETIME ONE


   Stop: 01/18/21 10:46


   Last Admin: 01/18/21 11:01 Dose:  5,000 units


   Documented by: 


Hydroxyzine HCl (Atarax)  25 mg PO BEDTIME PRN


   PRN Reason: Insomnia


   Last Admin: 01/15/21 21:37 Dose:  25 mg


   Documented by: 


Sodium Chloride (Normal Saline)  1,000 mls @ 75 mls/hr IV ASDIRECTED [facility]


   Last Infusion: 01/17/21 13:05 Dose:  50 mls/hr


   Documented by: 


Remdesivir 200 mg/ Sodium (Chloride)  250 mls @ 200 mls/hr IV ONETIME ONE


   Stop: 01/13/21 21:01


   Last Admin: 01/13/21 20:23 Dose:  200 mls/hr


   Documented by: 


Remdesivir 100 mg/ Sodium (Chloride)  100 mls @ 100 mls/hr IV Q24H [facility]


   Stop: 01/17/21 18:59


   Last Admin: 01/17/21 17:11 Dose:  100 mls/hr


   Documented by: 


Heparin Sodium/Sodium Chloride (Heparin 25,000 Units In 1/2 Ns 500 Ml)  25,000 

units in 500 mls @ 25.983 mls/hr IV TITRATE [facility]; Protocol


   Last Admin: 01/19/21 09:01 Dose:  9.84 units/kg/hr, 21.594 mls/hr


   Documented by: 


Insulin Glargine (Lantus Solostar)  20 units SUBCUT BID [facility]


   Last Admin: 01/13/21 20:26 Dose:  20 unit


   Documented by: 


Insulin Glargine (Lantus Solostar)  20 units SUBCUT BID [facility]


   Last Admin: 01/19/21 09:54 Dose:  Not Given


   Documented by: 


Iopamidol (Isovue-370 (76%))  100 ml IV .AS DIRECTED ONE


   Stop: 01/19/21 09:43


   Last Admin: 01/19/21 10:32 Dose:  100 ml


   Documented by: 


Lorazepam (Ativan)  1 mg IVPUSH ONETIME ONE


   Stop: 01/19/21 00:09


   Last Admin: 01/19/21 00:16 Dose:  1 mg


   Documented by: 


Lorazepam (Ativan) Confirm Administered Dose 2 mg .ROUTE .STK-MED ONE


   Stop: 01/19/21 00:11


   Last Admin: 01/19/21 00:17 Dose:  Not Given


   Documented by: 


Lorazepam (Ativan)  1 mg IVPUSH Q2H PRN


   PRN Reason: Anxiety


   Last Admin: 01/19/21 14:57 Dose:  1 mg


   Documented by: 


Morphine Sulfate (Morphine) Confirm Administered Dose 2 mg .ROUTE .STK-MED ONE


   Stop: 01/18/21 23:26


   Last Admin: 01/18/21 23:49 Dose:  Not Given


   Documented by: 


Morphine Sulfate (Morphine)  2 mg IVPUSH ONETIME ONE


   Stop: 01/18/21 23:29


   Last Admin: 01/18/21 23:28 Dose:  2 mg


   Documented by: 


Morphine Sulfate (Morphine)  2 mg IVPUSH Q2H PRN


   PRN Reason: air hunger


   Last Admin: 01/19/21 16:19 Dose:  2 mg


   Documented by: 


Olanzapine (Zyprexa)  5 mg IM ONETIME ONE


   Stop: 01/19/21 01:13


   Last Admin: 01/19/21 01:22 Dose:  5 mg


   Documented by: 


Ondansetron HCl (Zofran Odt)  4 mg PO Q6H PRN


   PRN Reason: nausea, able to take PO


Potassium Chloride (Klor-Con M20)  20 meq PO BID [facility]


   Last Admin: 01/19/21 08:51 Dose:  20 meq


   Documented by: 


Prednisone (Prednisone)  50 mg PO TID@0300,0900,2100 [facility]


   Stop: 01/19/21 09:01


   Last Admin: 01/19/21 08:52 Dose:  50 mg


   Documented by: 


Sitagliptin Phosphate (Januvia)  100 mg PO DAILY [facility]


   Last Admin: 01/14/21 09:03 Dose:  100 mg


   Documented by: 


Sodium Chloride (Saline Flush)  10 ml FLUSH ASDIRECTED PRN


   PRN Reason: Keep Vein Open











- Exam


General: Obtunded


Lungs: Decreased Breath Sounds, Crackles (BLL, RML), Other (Increased 

respiratory effort, but resting comfortably).  No: Wheezing


Cardiovascular: Regular Rate, Regular Rhythm


GI/Abdominal Exam: Normal Bowel Sounds, Soft, Non-Tender, No Distention


Peripheral Pulses: 2+: Radial (L), Radial (R)





Sepsis Event Note





- Evaluation


Sepsis Screening Result: Severe Sepsis Risk





- Focused Exam


Vital Signs: 


                                   Vital Signs











  Temp Pulse Resp BP Pulse Ox Pulse Ox


 


 01/20/21 13:30   68  29 H  112/49 L  95 


 


 01/20/21 13:00       90 L


 


 01/20/21 12:30   67  28 H  119/47 L  90 L 


 


 01/20/21 11:00       92 L


 


 01/20/21 10:30   68  30 H  132/65  93 L 


 


 01/20/21 09:30   67  31 H  117/49 L  91 L 


 


 01/20/21 09:00       88 L


 


 01/20/21 08:30   64  29 H  122/46 L  87 L 


 


 01/20/21 08:00  97.4 F  63  26 H  102/37 L  87 L  88 L


 


 01/20/21 07:07       89 L


 


 01/20/21 06:10       90 L


 


 01/20/21 06:00   66    


 


 01/20/21 04:00   64   110/59 L  91 L 


 


 01/20/21 02:35       91 L














- Problem List & Annotations


(1) COVID-19


SNOMED Code(s): 226485379


   Code(s): U07.1 - COVID-19   Status: Acute   Current Visit: Yes   





(2) Viral pneumonia


SNOMED Code(s): 58835795


   Code(s): J12.9 - VIRAL PNEUMONIA, UNSPECIFIED   Status: Acute   Current 

Visit: Yes   





(3) Hypoxic


SNOMED Code(s): 553774515


   Code(s): R09.02 - HYPOXEMIA   Status: Acute   Current Visit: Yes   Criss

otation/Comment:: on BiPAP   





(4) Diabetes


SNOMED Code(s): 23112565


   Code(s): E11.9 - TYPE 2 DIABETES MELLITUS WITHOUT COMPLICATIONS   Status: 

Chronic   Current Visit: Yes   


Qualifiers: 


   Diabetes mellitus long term insulin use: with long term use 





(5) GERD (gastroesophageal reflux disease)


SNOMED Code(s): 490660661


   Code(s): K21.9 - GASTRO-ESOPHAGEAL REFLUX DISEASE WITHOUT ESOPHAGITIS   

Status: Chronic   Priority: Medium   Current Visit: No   


Qualifiers: 


   Esophagitis presence: with esophagitis 





(6) Neurocognitive deficits


SNOMED Code(s): 034631439


   Code(s): R29.818 - OTHER SYMPTOMS AND SIGNS INVOLVING THE NERVOUS SYSTEM; 

R41.89 - OTH SYMPTOMS AND SIGNS W COGNITIVE FUNCTIONS AND AWARENESS   Status: 

Chronic   Current Visit: Yes   





(7) Palliative care patient


SNOMED Code(s): 189234323, 221527422


   Code(s): Z51.5 - ENCOUNTER FOR PALLIATIVE CARE   Status: Chronic   Current 

Visit: Yes   





(8) CHF (congestive heart failure)


SNOMED Code(s): 56649074


   Code(s): I50.9 - HEART FAILURE, UNSPECIFIED   Status: Chronic   Current 

Visit: Yes   


Qualifiers: 


   Heart failure type: unspecified 


Annotation/Comment:: Echo in 04/2018, EF of 65%   





(9) Delirium with dementia


SNOMED Code(s): 502880363


   Code(s): R41.0 - DISORIENTATION, UNSPECIFIED   Status: Acute   Current Visit:

Yes   





(10) Generalized weakness


SNOMED Code(s): 66941318


   Code(s): R53.1 - WEAKNESS   Status: Acute   Current Visit: Yes   





(11) Recurrent falls


SNOMED Code(s): 930922462


   Code(s): R29.6 - REPEATED FALLS   Status: Acute   Current Visit: Yes   





- Problem List Review


Problem List Initiated/Reviewed/Updated: Yes





- My Orders


Last 24 Hours: 


My Active Orders





01/19/21 16:45


QUEtiapine [SEROqueL]   25 mg PO BID PRN 





01/19/21 16:51


Morphine   2 mg IVPUSH Q1H PRN 





01/19/21 18:00


Enoxaparin [Lovenox]   40 mg SUBCUT DAILY@1800 





01/20/21 09:00


dexAMETHasone   6 mg IVPUSH DAILY 














- Plan


Plan:: 





1. COVID viral pneumonia: BiPAP, at 94% but bouncing down to 88, unable to wean 

at this time. Encourage water, tried high-flow nasal cannula so he could have 

lunch but he dropped into the 70s. Continue to try to give oral intake. Repeat 

labs tomorrow. 


2. DM: Accuchecks qidac&hs, Consistent carb diet, Lantus 10 units bid, Humalog 

high dose sliding scale. 


3. DVT: Hasmukh BLE, Lovenox 40 mg sq daily at 1600. 


4. He is maintaining currently, not eating very much. His pacemaker is firing 

%, discussed with family whether they wanted us to turn his pacemaker off,

 they will discuss amongst themselves and let us know. Poor prognosis, they 

would like spiritual services to continue to visit patient. His wife and son now

 have Covid they both were with him the week prior to admission.

## 2021-01-21 NOTE — PCM.PN
- General Info


Date of Service: 01/21/21


Subjective Update: 





Ramone's oxygen has been fluctuating, in low 90s when I was assessing but has 

dropped into 88, sometimes into the 70s. Not drinking much. Complaining of being

hot and cold. More agitated. WBC went up today. Check with pacemaker clinic 

stated he has a dual chamber but no defibrillator so does not need to be turned 

off. Family wants to take one day at a time, they do not want to make him 

comfort measures at this time. 





- Patient Data


Vitals - Most Recent: 


                                Last Vital Signs











Temp  97.8 F   01/21/21 04:00


 


Pulse  64   01/21/21 08:00


 


Resp  24 H  01/21/21 08:00


 


BP  148/74 H  01/21/21 08:00


 


Pulse Ox  97   01/21/21 08:00











Weight - Most Recent: 241 lb 14.4 oz


I&O - Last 24 Hours: 


                                 Intake & Output











 01/21/21 01/21/21 01/21/21





 06:59 14:59 22:59


 


Intake Total 150  


 


Balance 150  











Lab Results Last 24 Hours: 


                         Laboratory Results - last 24 hr











  01/20/21 01/20/21 01/21/21 Range/Units





  17:25 20:50 06:31 


 


WBC     (3.2-10.1)  x10-3/uL


 


RBC     (3.90-5.90)  x10(6)uL


 


Hgb     (12.9-17.7)  g/dL


 


Hct     (38.3-50.1)  %


 


MCV     (80.8-98.7)  fL


 


MCH     (27.0-33.3)  pg


 


MCHC     (28.7-35.3)  g/dL


 


RDW     (12.4-15.0)  %


 


Plt Count     (117-477)  x10(3)uL


 


MPV     (6.7-11.0)  fL


 


Neut % (Auto)     (40.3-71.8)  %


 


Lymph % (Auto)     (15.8-45.3)  %


 


Mono % (Auto)     (5.5-15.2)  %


 


Eos % (Auto)     (0.1-6.8)  %


 


Baso % (Auto)     (0.3-3.8)  %


 


Neut # (Auto)     (1.7-6.9)  x10-3/uL


 


Lymph # (Auto)     (0.5-4.5)  x10-3/uL


 


Mono # (Auto)     (0.0-1.2)  x10-3/uL


 


Eos # (Auto)     (0.0-0.6)  x10-3/uL


 


Baso # (Auto)     (0.0-0.3)  x10-3/uL


 


Sodium     (135-145)  mmol/L


 


Potassium     (3.5-5.3)  mmol/L


 


Chloride     (100-110)  mmol/L


 


Carbon Dioxide     (21-32)  mmol/L


 


BUN     (7-18)  mg/dL


 


Creatinine     (0.70-1.30)  mg/dL


 


Est Cr Clr Drug Dosing     mL/min


 


Estimated GFR (MDRD)     (>60)  


 


BUN/Creatinine Ratio     (9-20)  


 


Glucose     ()  mg/dL


 


POC Glucose  205 H  214 H  216 H  ()  mg/dL


 


Calcium     (8.6-10.2)  mg/dL














  01/21/21 01/21/21 01/21/21 Range/Units





  06:40 06:40 11:25 


 


WBC  11.2 H    (3.2-10.1)  x10-3/uL


 


RBC  4.84    (3.90-5.90)  x10(6)uL


 


Hgb  13.4    (12.9-17.7)  g/dL


 


Hct  41.5    (38.3-50.1)  %


 


MCV  85.7    (80.8-98.7)  fL


 


MCH  27.6    (27.0-33.3)  pg


 


MCHC  32.2    (28.7-35.3)  g/dL


 


RDW  15.9 H    (12.4-15.0)  %


 


Plt Count  87 L    (117-477)  x10(3)uL


 


MPV  8.8    (6.7-11.0)  fL


 


Neut % (Auto)  93.0 H    (40.3-71.8)  %


 


Lymph % (Auto)  3.0 L    (15.8-45.3)  %


 


Mono % (Auto)  3.6 L    (5.5-15.2)  %


 


Eos % (Auto)  0.3    (0.1-6.8)  %


 


Baso % (Auto)  0.1 L    (0.3-3.8)  %


 


Neut # (Auto)  10.4 H    (1.7-6.9)  x10-3/uL


 


Lymph # (Auto)  0.3 L    (0.5-4.5)  x10-3/uL


 


Mono # (Auto)  0.4    (0.0-1.2)  x10-3/uL


 


Eos # (Auto)  0.0    (0.0-0.6)  x10-3/uL


 


Baso # (Auto)  0.0    (0.0-0.3)  x10-3/uL


 


Sodium   145  D   (135-145)  mmol/L


 


Potassium   4.2   (3.5-5.3)  mmol/L


 


Chloride   109  D   (100-110)  mmol/L


 


Carbon Dioxide   23   (21-32)  mmol/L


 


BUN   22 H   (7-18)  mg/dL


 


Creatinine   1.2   (0.70-1.30)  mg/dL


 


Est Cr Clr Drug Dosing   59.19   mL/min


 


Estimated GFR (MDRD)   59 L   (>60)  


 


BUN/Creatinine Ratio   18.3   (9-20)  


 


Glucose   266 H   ()  mg/dL


 


POC Glucose    280 H  ()  mg/dL


 


Calcium   8.0 L   (8.6-10.2)  mg/dL











Med Orders - Current: 


                               Current Medications





Acetaminophen (Tylenol)  650 mg PO Q4H PRN


   PRN Reason: Pain (Mild 1-3)/fever


   Last Admin: 01/15/21 02:27 Dose:  650 mg


   Documented by: 


Albuterol (Proventil Neb Soln)  2.5 mg NEB Q4H PRN


   PRN Reason: Shortness of Breath


   Last Admin: 01/20/21 02:34 Dose:  2.5 mg


   Documented by: 


Budesonide (Pulmicort)  0.5 mg NEB BIDRT [facility]


   Last Admin: 01/21/21 06:01 Dose:  0.5 mg


   Documented by: 


Calcium Carbonate/Glycine (Tums)  500 mg PO Q2H PRN


   PRN Reason: Indigestion


   Last Admin: 01/16/21 10:36 Dose:  500 mg


   Documented by: 


Ceftriaxone Sodium (Rocephin)  1 gm IVPUSH Q24H [facility]


   Last Admin: 01/21/21 10:11 Dose:  1 gm


   Documented by: 


Dexamethasone (Dexamethasone)  6 mg IVPUSH DAILY [facility]


   Stop: 01/23/21 09:01


   Last Admin: 01/21/21 09:30 Dose:  6 mg


   Documented by: 


Dextrose/Water (Dextrose 50% In Water)  50 ml IVPUSH ASDIRECTED PRN


   PRN Reason: Hypoglycemia


Enoxaparin Sodium (Lovenox)  40 mg SUBCUT DAILY@1800 [facility]


   Last Admin: 01/20/21 17:29 Dose:  40 mg


   Documented by: 


Glucagon (Glucagen)  1 mg IM ASDIRECTED PRN


   PRN Reason: Hypoglycemia


Azithromycin 500 mg/ Sodium (Chloride)  250 mls @ 250 mls/hr IV Q24H [facility]


   Last Admin: 01/21/21 10:13 Dose:  250 mls/hr


   Documented by: 


Insulin Glargine (Lantus Solostar)  10 units SUBCUT BID [facility]


   Last Admin: 01/21/21 09:57 Dose:  10 units


   Documented by: 


Insulin Human Lispro (Humalog)  0 unit SUBCUT TIDMEALS [facility]; Protocol


   Last Admin: 01/21/21 11:27 Dose:  9 units


   Documented by: 


Metoprolol Succinate (Toprol Xl)  25 mg PO DAILY [facility]


   Last Admin: 01/21/21 09:46 Dose:  Not Given


   Documented by: 


Morphine Sulfate (Morphine)  2 mg IVPUSH Q1H PRN


   PRN Reason: air hunger


   Last Admin: 01/21/21 13:55 Dose:  2 mg


   Documented by: 


Nitroglycerin (Nitrostat)  0.4 mg SL ASDIRECTED PRN


   PRN Reason: Chest Pain


Pantoprazole Sodium (Protonix***)  40 mg PO BEDTIME [facility]


   Last Admin: 01/20/21 20:47 Dose:  40 mg


   Documented by: 


Quetiapine Fumarate (Seroquel)  25 mg PO BID PRN


   PRN Reason: DELIRIUM


   Last Admin: 01/19/21 20:02 Dose:  25 mg


   Documented by: 


Sodium Chloride (Saline Flush)  10 ml FLUSH ASDIRECTED PRN


   PRN Reason: Keep Vein Open


   Last Admin: 01/21/21 13:55 Dose:  10 ml


   Documented by: 





Discontinued Medications





Albuterol (Ventolin Hfa)  0 gm INH Q4H PRN


   PRN Reason: Wheezing


Atorvastatin Calcium (Lipitor)  40 mg PO BEDTIME [facility]


   Last Admin: 01/18/21 20:17 Dose:  40 mg


   Documented by: 


Clopidogrel Bisulfate (Plavix)  75 mg PO DAILY [facility]


   Last Admin: 01/18/21 08:22 Dose:  75 mg


   Documented by: 


Dexamethasone (Dexamethasone)  6 mg IVPUSH DAILY [facility]


   Stop: 01/23/21 17:16


   Last Admin: 01/17/21 08:58 Dose:  6 mg


   Documented by: 


Diphenhydramine HCl (Benadryl)  50 mg PO ONETIME ONE


   Stop: 01/19/21 09:01


   Last Admin: 01/19/21 08:51 Dose:  50 mg


   Documented by: 


Donepezil HCl (Aricept)  5 mg PO BEDTIME [facility]


   Last Admin: 01/18/21 20:16 Dose:  5 mg


   Documented by: 


Glipizide (Glucotrol Xl)  10 mg PO BID [facility]


   Last Admin: 01/13/21 20:15 Dose:  Not Given


   Documented by: 


Heparin Sodium (Porcine) (Heparin Sodium)  5,000 units IVPUSH ONETIME ONE


   Stop: 01/18/21 10:46


   Last Admin: 01/18/21 11:01 Dose:  5,000 units


   Documented by: 


Hydroxyzine HCl (Atarax)  25 mg PO BEDTIME PRN


   PRN Reason: Insomnia


   Last Admin: 01/15/21 21:37 Dose:  25 mg


   Documented by: 


Sodium Chloride (Normal Saline)  1,000 mls @ 75 mls/hr IV ASDIRECTED [facility]


   Last Infusion: 01/17/21 13:05 Dose:  50 mls/hr


   Documented by: 


Remdesivir 200 mg/ Sodium (Chloride)  250 mls @ 200 mls/hr IV ONETIME ONE


   Stop: 01/13/21 21:01


   Last Admin: 01/13/21 20:23 Dose:  200 mls/hr


   Documented by: 


Remdesivir 100 mg/ Sodium (Chloride)  100 mls @ 100 mls/hr IV Q24H [facility]


   Stop: 01/17/21 18:59


   Last Admin: 01/17/21 17:11 Dose:  100 mls/hr


   Documented by: 


Heparin Sodium/Sodium Chloride (Heparin 25,000 Units In 1/2 Ns 500 Ml)  25,000 

units in 500 mls @ 25.983 mls/hr IV TITRATE [facility]; Protocol


   Last Admin: 01/19/21 09:01 Dose:  9.84 units/kg/hr, 21.594 mls/hr


   Documented by: 


Insulin Glargine (Lantus Solostar)  20 units SUBCUT BID [facility]


   Last Admin: 01/13/21 20:26 Dose:  20 unit


   Documented by: 


Insulin Glargine (Lantus Solostar)  20 units SUBCUT BID [facility]


   Last Admin: 01/19/21 09:54 Dose:  Not Given


   Documented by: 


Iopamidol (Isovue-370 (76%))  100 ml IV .AS DIRECTED ONE


   Stop: 01/19/21 09:43


   Last Admin: 01/19/21 10:32 Dose:  100 ml


   Documented by: 


Lorazepam (Ativan)  1 mg IVPUSH ONETIME ONE


   Stop: 01/19/21 00:09


   Last Admin: 01/19/21 00:16 Dose:  1 mg


   Documented by: 


Lorazepam (Ativan) Confirm Administered Dose 2 mg .ROUTE .STK-MED ONE


   Stop: 01/19/21 00:11


   Last Admin: 01/19/21 00:17 Dose:  Not Given


   Documented by: 


Lorazepam (Ativan)  1 mg IVPUSH Q2H PRN


   PRN Reason: Anxiety


   Last Admin: 01/19/21 14:57 Dose:  1 mg


   Documented by: 


Morphine Sulfate (Morphine) Confirm Administered Dose 2 mg .ROUTE .STK-MED ONE


   Stop: 01/18/21 23:26


   Last Admin: 01/18/21 23:49 Dose:  Not Given


   Documented by: 


Morphine Sulfate (Morphine)  2 mg IVPUSH ONETIME ONE


   Stop: 01/18/21 23:29


   Last Admin: 01/18/21 23:28 Dose:  2 mg


   Documented by: 


Morphine Sulfate (Morphine)  2 mg IVPUSH Q2H PRN


   PRN Reason: air hunger


   Last Admin: 01/19/21 16:19 Dose:  2 mg


   Documented by: 


Olanzapine (Zyprexa)  5 mg IM ONETIME ONE


   Stop: 01/19/21 01:13


   Last Admin: 01/19/21 01:22 Dose:  5 mg


   Documented by: 


Ondansetron HCl (Zofran Odt)  4 mg PO Q6H PRN


   PRN Reason: nausea, able to take PO


Potassium Chloride (Klor-Con M20)  20 meq PO BID [facility]


   Last Admin: 01/19/21 08:51 Dose:  20 meq


   Documented by: 


Prednisone (Prednisone)  50 mg PO TID@0300,0900,2100 [facility]


   Stop: 01/19/21 09:01


   Last Admin: 01/19/21 08:52 Dose:  50 mg


   Documented by: 


Sitagliptin Phosphate (Januvia)  100 mg PO DAILY [facility]


   Last Admin: 01/14/21 09:03 Dose:  100 mg


   Documented by: 


Sodium Chloride (Saline Flush)  10 ml FLUSH ASDIRECTED PRN


   PRN Reason: Keep Vein Open











- Exam


Quality Assessment: Supplemental Oxygen (BiPAP)


General: Mild Distress


Lungs: Clear to Auscultation (BUL), Decreased Breath Sounds (RLL), Crackles 

(LLL), Other (Increased respiratory effort).  No: Wheezing


Cardiovascular: Regular Rate, Regular Rhythm


GI/Abdominal Exam: Normal Bowel Sounds, Soft, Non-Tender, No Distention


Skin: Warm, Dry, Ecchymosis (Right upper arm)





Sepsis Event Note





- Evaluation


Sepsis Screening Result: No Definite Risk





- Focused Exam


Vital Signs: 


                                   Vital Signs











  Temp Pulse Resp BP Pulse Ox Pulse Ox


 


 01/21/21 08:00   64  24 H  148/74 H  94 L  97


 


 01/21/21 06:41   64    


 


 01/21/21 06:01   63    


 


 01/21/21 05:00   63    


 


 01/21/21 04:00  97.8 F  65  29 H  112/52 L  90 L 














- Problem List & Annotations


(1) Community acquired pneumonia


SNOMED Code(s): 042560383


   Code(s): J18.9 - PNEUMONIA, UNSPECIFIED ORGANISM   Status: Acute   Current 

Visit: Yes   





(2) Acute respiratory distress syndrome (ARDS) due to COVID-19 virus


SNOMED Code(s): 394485729679694118


   Code(s): U07.1 - COVID-19; J80 - ACUTE RESPIRATORY DISTRESS SYNDROME   

Status: Acute   Current Visit: Yes   





(3) COVID-19


SNOMED Code(s): 067276269


   Code(s): U07.1 - COVID-19   Status: Acute   Current Visit: Yes   





(4) Viral pneumonia


SNOMED Code(s): 65747834


   Code(s): J12.9 - VIRAL PNEUMONIA, UNSPECIFIED   Status: Acute   Current 

Visit: Yes   





(5) Hypoxic


SNOMED Code(s): 102710327


   Code(s): R09.02 - HYPOXEMIA   Status: Acute   Current Visit: Yes   

Annotation/Comment:: on BiPAP   





(6) Diabetes


SNOMED Code(s): 10637631


   Code(s): E11.9 - TYPE 2 DIABETES MELLITUS WITHOUT COMPLICATIONS   Status: 

Chronic   Current Visit: Yes   


Qualifiers: 


   Diabetes mellitus long term insulin use: with long term use 





(7) GERD (gastroesophageal reflux disease)


SNOMED Code(s): 018555504


   Code(s): K21.9 - GASTRO-ESOPHAGEAL REFLUX DISEASE WITHOUT ESOPHAGITIS   

Status: Chronic   Priority: Medium   Current Visit: No   


Qualifiers: 


   Esophagitis presence: with esophagitis 





(8) Neurocognitive deficits


SNOMED Code(s): 120586632


   Code(s): R29.818 - OTHER SYMPTOMS AND SIGNS INVOLVING THE NERVOUS SYSTEM; 

R41.89 - OTH SYMPTOMS AND SIGNS W COGNITIVE FUNCTIONS AND AWARENESS   Status: 

Chronic   Current Visit: Yes   





(9) Palliative care patient


SNOMED Code(s): 806184527, 868663159


   Code(s): Z51.5 - ENCOUNTER FOR PALLIATIVE CARE   Status: Chronic   Current 

Visit: Yes   





(10) CHF (congestive heart failure)


SNOMED Code(s): 13377808


   Code(s): I50.9 - HEART FAILURE, UNSPECIFIED   Status: Chronic   Current 

Visit: Yes   


Qualifiers: 


   Heart failure type: unspecified 


Annotation/Comment:: Echo in 04/2018, EF of 65%   





(11) Delirium with dementia


SNOMED Code(s): 848532559


   Code(s): R41.0 - DISORIENTATION, UNSPECIFIED   Status: Acute   Current Visit:

Yes   





(12) Generalized weakness


SNOMED Code(s): 38315420


   Code(s): R53.1 - WEAKNESS   Status: Acute   Current Visit: Yes   





(13) Recurrent falls


SNOMED Code(s): 365083750


   Code(s): R29.6 - REPEATED FALLS   Status: Acute   Current Visit: Yes   





- Problem List Review


Problem List Initiated/Reviewed/Updated: Yes





- My Orders


Last 24 Hours: 


My Active Orders





01/21/21 09:45


cefTRIAXone [Rocephin]   1 gm IVPUSH Q24H 





01/21/21 10:00


Azithromycin [Zithromax] 500 mg   Sodium Chloride 0.9% [Normal Saline (AdvBag)] 

250 ml IV Q24H 





01/22/21 06:00


BASIC METABOLIC PANEL,BMP [CHEM] Routine 


CBC WITH AUTO DIFF [HEME] Routine 














- Plan


Plan:: 





1. COVID viral pneumonia/ARDS: BiPAP, at 92% but bouncing down to 88 & 70s, 

unable to wean at this time. Continue to try to give oral intake. Repeat labs 

tomorrow. 


2. CAP: Rocephin & Azithromycin started today, WBC bumped up to 11.2.


2. DM: Accuchecks qidac&hs, Consistent carb diet, Lantus 10 units bid, Humalog 

high dose sliding scale. 


3. DVT: TEDs BLE, Lovenox 40 mg sq daily at 1600. Platelets down to 87 but per 

covid management protocol, contraindicated <25-30K, active bleeding then would 

need to switch to SCDs.


4. He is maintaining currently, not eating very much. His pacemaker will not 

prolong his life, does not have defibrillator. Poor prognosis, they would like 

spiritual services to continue to visit patient. His wife and son now have Covid

 they both were with him the week prior to admission. They want to give him a 

chance to turn around, aware prognosis is poor. Do not want to change to comfort

 measures at this time.

## 2024-11-03 NOTE — [id number]
**Discharge Summary





- Hospital Course


HPI Initial Comments: 





Maksim was seen in [facility] today for 7 days of fever, fatigue, 

shortness of breath, cough, runny nose but no sore throat, chest pain, nausea, 

vomiting, diarrhea, constipation. Denies any dysuria, frequency, dark urine. 

States his last bowel movement was yesterday, yellow chunks but not liquid. Temp

in clinic today was 101F, Covid was positive and had bilateral infiltrates on 

chest x-ray, no other labs done in clinic. He is very hard of hearing and has 

some neurocognitive deficits, on Aricept. History of Diabetes on insulin, poorly

controlled, pacemaker, hypertension, cataract repair, colon polyps, GERD. 





Diagnosis: Stroke: No





- Discharge Data


Discharge Date: 21


Discharge Disposition:  20


Preliminary Cause of Death *Q: Respiratory Failure


Event(s) Leading to Patient's Death *Q: Covid viral pneumonia, Acute respiratory

distress syndrome, bacterial pneumonia, Acute respiratory failure, 

cardiopulmonary arrest


Condition: 





- Referral to Home Health


Primary Care Physician: 


Maksim Love MD








- Discharge Diagnosis/Problem(s)


(1) Community acquired pneumonia


SNOMED Code(s): 902081587


   ICD Code: J18.9 - PNEUMONIA, UNSPECIFIED ORGANISM   Status: Acute   





(2) Acute respiratory distress syndrome (ARDS) due to COVID-19 virus


SNOMED Code(s): 365764062450223213


   ICD Code: U07.1 - COVID-19; J80 - ACUTE RESPIRATORY DISTRESS SYNDROME   

Status: Acute   





(3) COVID-19


SNOMED Code(s): 077360823


   ICD Code: U07.1 - COVID-19   Status: Acute   





(4) Viral pneumonia


SNOMED Code(s): 42974157


   ICD Code: J12.9 - VIRAL PNEUMONIA, UNSPECIFIED   Status: Acute   





(5) Hypoxic


SNOMED Code(s): 998070803


   ICD Code: R09.02 - HYPOXEMIA   Status: Acute   Problem Details: on BiPAP   





(6) Diabetes


SNOMED Code(s): 30437369


   ICD Code: E11.9 - TYPE 2 DIABETES MELLITUS WITHOUT COMPLICATIONS   Status: 

Chronic   


Qualifiers: 


   Diabetes mellitus long term insulin use: with long term use 





(7) GERD (gastroesophageal reflux disease)


SNOMED Code(s): 590650686


   ICD Code: K21.9 - GASTRO-ESOPHAGEAL REFLUX DISEASE WITHOUT ESOPHAGITIS   

Status: Chronic   Priority: Medium   


Qualifiers: 


   Esophagitis presence: with esophagitis 





(8) Neurocognitive deficits


SNOMED Code(s): 407903295


   ICD Code: R29.818 - OTHER SYMPTOMS AND SIGNS INVOLVING THE NERVOUS SYSTEM; 

R41.89 - OTH SYMPTOMS AND SIGNS W COGNITIVE FUNCTIONS AND AWARENESS   Status: 

Chronic   





(9) Palliative care patient


SNOMED Code(s): 073888041, 880424043


   ICD Code: Z51.5 - ENCOUNTER FOR PALLIATIVE CARE   Status: Chronic   





(10) CHF (congestive heart failure)


SNOMED Code(s): 00534961


   ICD Code: I50.9 - HEART FAILURE, UNSPECIFIED   Status: Chronic   Problem 

Details: Echo in 2018, EF of 65%   


Qualifiers: 


   Heart failure type: unspecified 





(11) Delirium with dementia


SNOMED Code(s): 574403678


   ICD Code: R41.0 - DISORIENTATION, UNSPECIFIED   Status: Acute   





(12) Generalized weakness


SNOMED Code(s): 54048098


   ICD Code: R53.1 - WEAKNESS   Status: Acute   





(13) Recurrent falls


SNOMED Code(s): 042770910


   ICD Code: R29.6 - REPEATED FALLS   Status: Acute   





- Patient Summary/Data


Hospital Course: 





Ramone was admitted from clinic for viral pneumonia secondary to Covid-19, had 

bilateral pneumonia on chest x-ray. Labs were drawn, creatinine was in range to 

start Remdesivir & Dexamethasone per protocol on admission. He was started on NS

at 75 ml/hr for poor oral intake, required 5L of oxygen by nasal cannula. His 

sodium went up the next day so was switched to D5W, this was discontinued as 

patient was getting himself wrapped in the tubing to point it was safety hazard.

His WBC was in normal to low range up to  when his WBC came up to 11.2 with 

elevated neutrophils. His platelets were low initially and came up briefly on 

 & . His procalcitonin was low at admission so antibiotics were not 

started until WBC went up on , Rocephin & Azithromycin were given x1. He was

weaned down to 1L on  & , had improved his oral intake but more 

delirious, up at night and sleeping during the day. His wife stated this was 

what he did at home. Tried hydroxyzine at night but that made him quite sedated.

Discontinued Dexamethasone on  due to delirium. He completed his 

Remdesivir treatment. The evening of the  his desaturated, attempted high-

flow oxygen at 10L, unable to maintain his saturations, was placed on BiPAP 

setting of 10/5 at 70% FiO2, he maintained in 90-94%. He was changed to ICU 

status. On  evening had episodes of desaturation even with titrating up FiO2

didn't change his saturations. His chest x-ray showed worsening normal and 

consistent with ARDS. He was initially full code per his PCP who sent from 

clinic, when discussing change of his condition with family they informed us 

that he did have an advance directive that he did not want CPR or ventilator so 

his code status was changed to DNR/DNI. Family did not want patient transferred.

D Dimer was >35, due to his iodine allergy, was premedicated with Prednisone and

Benadryl 13 hours prior to CTA. CT showed no pulmonary embolism but worsening 

pneumonia. He had been placed on Heparin drip per protocol prior to CT scan 

while awaiting results. No PE found so changed to Lovenox 40 mg SQ daily through

. Dexamethasone was restarted after the Prednisone pretreatment was 

completed. His blood sugars were stable in the 200s. He was on Lantus 20 bid and

Humalog sliding scale, adjusted for his sugars and eating. After he was placed 

on BiPAP, he required Morphine 2 mg and Seroquel 25 mg bid prn air 

hunger/agitation respectively. He also had ativan ordered but this made him more

agitated so was discontinued. On afternoon of , he became more agitated, 

saying he wanted mask off and allowed to die. Family was notified of this and 

they were in agreement with his wishes. BiPAP & cardiac monitor were removed, he

was changed to comfort measures only. Morphine changed from q2h to q30min. 

Spiritual services had seen him on ,  & , they were at bedside with 

him after he was made comfort measures. Family also spoke to him on the phone 

after BiPAP had been discontinued. He would not keep oxygen by nasal cannula on 

in evening. Had Morphine at 0011 on  for air hunger and moaning. He  

at 0016 on . Family was contacted soon after. 





- Discharge Plan


*PRESCRIPTION DRUG MONITORING PROGRAM REVIEWED*: Not Applicable


*COPY OF PRESCRIPTION DRUG MONITORING REPORT IN PATIENT FIORDALIZA: Not Applicable





- Discharge Summary/Plan Comment


DC Time >30 min.: Yes





- Patient Data


Vitals - Most Recent: 


                                Last Vital Signs











Temp  96.4 F L  21 21:00


 


Pulse  96   21 21:00


 


Resp  32 H  21 21:00


 


BP  105/42 L  21 21:00


 


Pulse Ox  55 L  21 21:00











Weight - Most Recent: 241 lb 14.4 oz


Med Orders - Current: 


                               Current Medications








Discontinued Medications





Acetaminophen (Tylenol)  650 mg PO Q4H PRN


   PRN Reason: Pain (Mild 1-3)/fever


   Last Admin: 01/15/21 02:27 Dose:  650 mg


   Documented by: 


Albuterol (Ventolin Hfa)  0 gm INH Q4H PRN


   PRN Reason: Wheezing


Albuterol (Proventil Neb Soln)  2.5 mg NEB Q4H PRN


   PRN Reason: Shortness of Breath


   Last Admin: 21 02:34 Dose:  2.5 mg


   Documented by: 


Atorvastatin Calcium (Lipitor)  40 mg PO BEDTIME [facility]


   Last Admin: 21 20:17 Dose:  40 mg


   Documented by: 


Budesonide (Pulmicort)  0.5 mg NEB BIDRT [facility]


   Last Admin: 21 23:37 Dose:  Not Given


   Documented by: 


Calcium Carbonate/Glycine (Tums)  500 mg PO Q2H PRN


   PRN Reason: Indigestion


   Last Admin: 21 10:36 Dose:  500 mg


   Documented by: 


Ceftriaxone Sodium (Rocephin)  1 gm IVPUSH Q24H [facility]


   Last Admin: 21 10:11 Dose:  1 gm


   Documented by: 


Clopidogrel Bisulfate (Plavix)  75 mg PO DAILY [facility]


   Last Admin: 21 08:22 Dose:  75 mg


   Documented by: 


Dexamethasone (Dexamethasone)  6 mg IVPUSH DAILY [facility]


   Stop: 21 17:16


   Last Admin: 21 08:58 Dose:  6 mg


   Documented by: 


Dexamethasone (Dexamethasone)  6 mg IVPUSH DAILY [facility]


   Stop: 21 09:01


   Last Admin: 21 09:30 Dose:  6 mg


   Documented by: 


Dextrose/Water (Dextrose 50% In Water)  50 ml IVPUSH ASDIRECTED PRN


   PRN Reason: Hypoglycemia


Diphenhydramine HCl (Benadryl)  50 mg PO ONETIME ONE


   Stop: 21 09:01


   Last Admin: 21 08:51 Dose:  50 mg


   Documented by: 


Donepezil HCl (Aricept)  5 mg PO BEDTIME [facility]


   Last Admin: 21 20:16 Dose:  5 mg


   Documented by: 


Enoxaparin Sodium (Lovenox)  40 mg SUBCUT DAILY@1800 [facility]


   Last Admin: 21 17:29 Dose:  40 mg


   Documented by: 


Glipizide (Glucotrol Xl)  10 mg PO BID [facility]


   Last Admin: 21 20:15 Dose:  Not Given


   Documented by: 


Glucagon (Glucagen)  1 mg IM ASDIRECTED PRN


   PRN Reason: Hypoglycemia


Heparin Sodium (Porcine) (Heparin Sodium)  5,000 units IVPUSH ONETIME ONE


   Stop: 21 10:46


   Last Admin: 21 11:01 Dose:  5,000 units


   Documented by: 


Hydroxyzine HCl (Atarax)  25 mg PO BEDTIME PRN


   PRN Reason: Insomnia


   Last Admin: 01/15/21 21:37 Dose:  25 mg


   Documented by: 


Sodium Chloride (Normal Saline)  1,000 mls @ 75 mls/hr IV ASDIRECTED [facility]


   Last Infusion: 21 13:05 Dose:  50 mls/hr


   Documented by: 


Remdesivir 200 mg/ Sodium (Chloride)  250 mls @ 200 mls/hr IV ONETIME ONE


   Stop: 21 21:01


   Last Admin: 21 20:23 Dose:  200 mls/hr


   Documented by: 


Remdesivir 100 mg/ Sodium (Chloride)  100 mls @ 100 mls/hr IV Q24H [facility]


   Stop: 21 18:59


   Last Admin: 21 17:11 Dose:  100 mls/hr


   Documented by: 


Heparin Sodium/Sodium Chloride (Heparin 25,000 Units In 1/2 Ns 500 Ml)  25,000 

units in 500 mls @ 25.983 mls/hr IV TITRATE [facility]; Protocol


   Last Admin: 21 09:01 Dose:  9.84 units/kg/hr, 21.594 mls/hr


   Documented by: 


Azithromycin 500 mg/ Sodium (Chloride)  250 mls @ 250 mls/hr IV Q24H [facility]


   Last Admin: 21 10:13 Dose:  250 mls/hr


   Documented by: 


Insulin Glargine (Lantus Solostar)  20 units SUBCUT BID [facility]


   Last Admin: 21 20:26 Dose:  20 unit


   Documented by: 


Insulin Glargine (Lantus Solostar)  20 units SUBCUT BID [facility]


   Last Admin: 21 09:54 Dose:  Not Given


   Documented by: 


Insulin Glargine (Lantus Solostar)  10 units SUBCUT BID [facility]


   Last Admin: 21 09:57 Dose:  10 units


   Documented by: 


Insulin Glargine (Lantus Solostar)  300 units SUBCUT .STK-MED ONE


   Stop: 21 20:01


Insulin Human Lispro (Humalog)  0 unit SUBCUT TIDMEALS [facility]; Protocol


   Last Admin: 21 11:27 Dose:  9 units


   Documented by: 


Insulin Human Lispro (Humalog)  300 unit SUBCUT .STK-MED ONE


   Stop: 21 08:54


Iopamidol (Isovue-370 (76%))  100 ml IV .AS DIRECTED ONE


   Stop: 21 09:43


   Last Admin: 21 10:32 Dose:  100 ml


   Documented by: 


Lorazepam (Ativan)  1 mg IVPUSH ONETIME ONE


   Stop: 21 00:09


   Last Admin: 21 00:16 Dose:  1 mg


   Documented by: 


Lorazepam (Ativan) Confirm Administered Dose 2 mg .ROUTE .STK-MED ONE


   Stop: 21 00:11


   Last Admin: 21 00:17 Dose:  Not Given


   Documented by: 


Lorazepam (Ativan)  1 mg IVPUSH Q2H PRN


   PRN Reason: Anxiety


   Last Admin: 21 14:57 Dose:  1 mg


   Documented by: 


Metoprolol Succinate (Toprol Xl)  25 mg PO DAILY [facility]


   Last Admin: 21 09:46 Dose:  Not Given


   Documented by: 


Morphine Sulfate (Morphine) Confirm Administered Dose 2 mg .ROUTE .STK-MED ONE


   Stop: 21 23:26


   Last Admin: 21 23:49 Dose:  Not Given


   Documented by: 


Morphine Sulfate (Morphine)  2 mg IVPUSH ONETIME ONE


   Stop: 21 23:29


   Last Admin: 21 23:28 Dose:  2 mg


   Documented by: 


Morphine Sulfate (Morphine)  2 mg IVPUSH Q2H PRN


   PRN Reason: air hunger


   Last Admin: 21 16:19 Dose:  2 mg


   Documented by: 


Morphine Sulfate (Morphine)  2 mg IVPUSH Q1H PRN


   PRN Reason: air hunger


   Last Admin: 21 13:55 Dose:  2 mg


   Documented by: 


Morphine Sulfate (Morphine)  2 mg IVPUSH Q30M PRN


   PRN Reason: air hunger


   Last Admin: 21 00:11 Dose:  2 mg


   Documented by: 


Nitroglycerin (Nitrostat)  0.4 mg SL ASDIRECTED PRN


   PRN Reason: Chest Pain


Olanzapine (Zyprexa)  5 mg IM ONETIME ONE


   Stop: 21 01:13


   Last Admin: 21 01:22 Dose:  5 mg


   Documented by: 


Ondansetron HCl (Zofran Odt)  4 mg PO Q6H PRN


   PRN Reason: nausea, able to take PO


Pantoprazole Sodium (Protonix***)  40 mg PO BEDTIME [facility]


   Last Admin: 21 20:47 Dose:  40 mg


   Documented by: 


Potassium Chloride (Klor-Con M20)  20 meq PO BID [facility]


   Last Admin: 21 08:51 Dose:  20 meq


   Documented by: 


Prednisone (Prednisone)  50 mg PO TID@0300,0900,2100 [facility]


   Stop: 21 09:01


   Last Admin: 21 08:52 Dose:  50 mg


   Documented by: 


Quetiapine Fumarate (Seroquel)  25 mg PO BID PRN


   PRN Reason: DELIRIUM


   Last Admin: 21 20:02 Dose:  25 mg


   Documented by: 


Sitagliptin Phosphate (Januvia)  100 mg PO DAILY [facility]


   Last Admin: 21 09:03 Dose:  100 mg


   Documented by: 


Sodium Chloride (Saline Flush)  10 ml FLUSH ASDIRECTED PRN


   PRN Reason: Keep Vein Open


Sodium Chloride (Saline Flush)  10 ml FLUSH ASDIRECTED PRN


   PRN Reason: Keep Vein Open


   Last Admin: 21 00:13 Dose:  10 ml


   Documented by:
n/a